# Patient Record
Sex: FEMALE | Race: WHITE | Employment: OTHER | URBAN - METROPOLITAN AREA
[De-identification: names, ages, dates, MRNs, and addresses within clinical notes are randomized per-mention and may not be internally consistent; named-entity substitution may affect disease eponyms.]

---

## 2017-05-12 ENCOUNTER — HOSPITAL ENCOUNTER (OUTPATIENT)
Dept: LAB | Age: 77
Discharge: HOME | End: 2017-05-12

## 2018-03-25 ENCOUNTER — HOSPITAL ENCOUNTER (EMERGENCY)
Age: 78
Discharge: HOME | End: 2018-03-25
Attending: EMERGENCY MEDICINE | Admitting: EMERGENCY MEDICINE
Payer: MEDICARE

## 2018-03-25 ENCOUNTER — APPOINTMENT (OUTPATIENT)
Dept: GENERAL RADIOLOGY | Age: 78
End: 2018-03-25
Attending: PHYSICIAN ASSISTANT
Payer: MEDICARE

## 2018-03-25 VITALS
TEMPERATURE: 98.6 F | RESPIRATION RATE: 16 BRPM | WEIGHT: 272 LBS | SYSTOLIC BLOOD PRESSURE: 145 MMHG | HEART RATE: 91 BPM | DIASTOLIC BLOOD PRESSURE: 99 MMHG | BODY MASS INDEX: 43.71 KG/M2 | OXYGEN SATURATION: 95 % | HEIGHT: 66 IN

## 2018-03-25 DIAGNOSIS — S80.12XA LEG HEMATOMA, LEFT, INITIAL ENCOUNTER: ICD-10-CM

## 2018-03-25 DIAGNOSIS — M25.562 LEFT KNEE PAIN, UNSPECIFIED CHRONICITY: Primary | ICD-10-CM

## 2018-03-25 PROCEDURE — 74011250637 HC RX REV CODE- 250/637: Performed by: PHYSICIAN ASSISTANT

## 2018-03-25 PROCEDURE — 73502 X-RAY EXAM HIP UNI 2-3 VIEWS: CPT

## 2018-03-25 PROCEDURE — 99283 EMERGENCY DEPT VISIT LOW MDM: CPT

## 2018-03-25 PROCEDURE — 73562 X-RAY EXAM OF KNEE 3: CPT

## 2018-03-25 PROCEDURE — 93971 EXTREMITY STUDY: CPT

## 2018-03-25 PROCEDURE — 73564 X-RAY EXAM KNEE 4 OR MORE: CPT

## 2018-03-25 RX ORDER — TRAMADOL HYDROCHLORIDE 50 MG/1
50 TABLET ORAL
Status: COMPLETED | OUTPATIENT
Start: 2018-03-25 | End: 2018-03-25

## 2018-03-25 RX ORDER — TRAMADOL HYDROCHLORIDE 50 MG/1
50 TABLET ORAL
Qty: 20 TAB | Refills: 0 | Status: SHIPPED | OUTPATIENT
Start: 2018-03-25 | End: 2018-04-04

## 2018-03-25 RX ADMIN — TRAMADOL HYDROCHLORIDE 50 MG: 50 TABLET, FILM COATED ORAL at 18:34

## 2018-03-26 NOTE — DISCHARGE INSTRUCTIONS
Knee Pain or Injury: Care Instructions  Your Care Instructions    Injuries are a common cause of knee problems. Sudden (acute) injuries may be caused by a direct blow to the knee. They can also be caused by abnormal twisting, bending, or falling on the knee. Pain, bruising, or swelling may be severe, and may start within minutes of the injury. Overuse is another cause of knee pain. Other causes are climbing stairs, kneeling, and other activities that use the knee. Everyday wear and tear, especially as you get older, also can cause knee pain. Rest, along with home treatment, often relieves pain and allows your knee to heal. If you have a serious knee injury, you may need tests and treatment. Follow-up care is a key part of your treatment and safety. Be sure to make and go to all appointments, and call your doctor if you are having problems. It's also a good idea to know your test results and keep a list of the medicines you take. How can you care for yourself at home? · Be safe with medicines. Read and follow all instructions on the label. ¨ If the doctor gave you a prescription medicine for pain, take it as prescribed. ¨ If you are not taking a prescription pain medicine, ask your doctor if you can take an over-the-counter medicine. · Rest and protect your knee. Take a break from any activity that may cause pain. · Put ice or a cold pack on your knee for 10 to 20 minutes at a time. Put a thin cloth between the ice and your skin. · Prop up a sore knee on a pillow when you ice it or anytime you sit or lie down for the next 3 days. Try to keep it above the level of your heart. This will help reduce swelling. · If your knee is not swollen, you can put moist heat, a heating pad, or a warm cloth on your knee. · If your doctor recommends an elastic bandage, sleeve, or other type of support for your knee, wear it as directed.   · Follow your doctor's instructions about how much weight you can put on your leg. Use a cane, crutches, or a walker as instructed. · Follow your doctor's instructions about activity during your healing process. If you can do mild exercise, slowly increase your activity. · Reach and stay at a healthy weight. Extra weight can strain the joints, especially the knees and hips, and make the pain worse. Losing even a few pounds may help. When should you call for help? Call 911 anytime you think you may need emergency care. For example, call if:  ? · You have symptoms of a blood clot in your lung (called a pulmonary embolism). These may include:  ¨ Sudden chest pain. ¨ Trouble breathing. ¨ Coughing up blood. ?Call your doctor now or seek immediate medical care if:  ? · You have severe or increasing pain. ? · Your leg or foot turns cold or changes color. ? · You cannot stand or put weight on your knee. ? · Your knee looks twisted or bent out of shape. ? · You cannot move your knee. ? · You have signs of infection, such as:  ¨ Increased pain, swelling, warmth, or redness. ¨ Red streaks leading from the knee. ¨ Pus draining from a place on your knee. ¨ A fever. ? · You have signs of a blood clot in your leg (called a deep vein thrombosis), such as:  ¨ Pain in your calf, back of the knee, thigh, or groin. ¨ Redness and swelling in your leg or groin. ? Watch closely for changes in your health, and be sure to contact your doctor if:  ? · You have tingling, weakness, or numbness in your knee. ? · You have any new symptoms, such as swelling. ? · You have bruises from a knee injury that last longer than 2 weeks. ? · You do not get better as expected. Where can you learn more? Go to http://bartolome-hiram.info/. Enter K195 in the search box to learn more about \"Knee Pain or Injury: Care Instructions. \"  Current as of: March 20, 2017  Content Version: 11.4  © 5425-2294 [employer].  Care instructions adapted under license by [employer] (which disclaims liability or warranty for this information). If you have questions about a medical condition or this instruction, always ask your healthcare professional. [address] any warranty or liability for your use of this information. Contusion: Care Instructions  Your Care Instructions    Contusion is the medical term for a bruise. It is the result of a direct blow or an impact, such as a fall. Contusions are common sports injuries. Most people think of a bruise as a black-and-blue spot. This happens when small blood vessels get torn and leak blood under the skin. But bones, muscles, and organs can also get bruised. This may damage deep tissues but not cause a bruise you can see. The doctor will do a physical exam to find the location of your contusion. You may also have tests to make sure you do not have a more serious injury, such as a broken bone or nerve damage. These may include X-rays or other imaging tests like a CT scan or MRI. Deep-tissue contusions may cause pain and swelling. But if there is no serious damage, they will often get better in a few weeks with home treatment. The doctor has checked you carefully, but problems can develop later. If you notice any problems or new symptoms, get medical treatment right away. Follow-up care is a key part of your treatment and safety. Be sure to make and go to all appointments, and call your doctor if you are having problems. It's also a good idea to know your test results and keep a list of the medicines you take. How can you care for yourself at home? · Put ice or a cold pack on the sore area for 10 to 20 minutes at a time to stop swelling. Put a thin cloth between the ice pack and your skin. · Be safe with medicines. Read and follow all instructions on the label. ¨ If the doctor gave you a prescription medicine for pain, take it as prescribed.   ¨ If you are not taking a prescription pain medicine, ask your doctor if you can take an over-the-counter medicine. · If you can, prop up the sore area on pillows as much as possible for the next few days. Try to keep the sore area above the level of your heart. When should you call for help? Call your doctor now or seek immediate medical care if:  ? · Your pain gets worse. ? · You have new or worse swelling. ? · You have tingling, weakness, or numbness in the area near the contusion. ? · The area near the contusion is cold or pale. ? Watch closely for changes in your health, and be sure to contact your doctor if:  ? · You do not get better as expected. Where can you learn more? Go to http://bartolome-hiram.info/. Enter P223 in the search box to learn more about \"Contusion: Care Instructions. \"  Current as of: March 20, 2017  Content Version: 11.4  © 6814-4046 [employer]. Care instructions adapted under license by [employer] (which disclaims liability or warranty for this information). If you have questions about a medical condition or this instruction, always ask your healthcare professional. [place] [address] any warranty or liability for your use of this information.

## 2018-03-26 NOTE — PROCEDURES
[facility]  *** FINAL REPORT ***    Name: Steven Metcalf  MRN: [id number]  : 1940  HIS Order #: [id number]  [id number] [facility] Visit #: [id number]  Date: 25 Mar 2018    TYPE OF TEST: Peripheral Venous Testing    REASON FOR TEST  Pain in limb, Limb swelling, S/P Fall    Left Leg:-  Deep venous thrombosis:           No  Superficial venous thrombosis:    No  Deep venous insufficiency:        No  Superficial venous insufficiency: No      INTERPRETATION/FINDINGS  PROCEDURE:  LEFT LOWER EXTREMITY VENOUS DUPLEX . Evaluation of lower  extremity veins with ultrasound (B-mode imaging, pulsed Doppler, color   Doppler). Includes the common femoral, deep femoral, femoral,  popliteal, posterior tibial, peroneal, and great saphenous veins. Other veins, for example the gastrocnemius and soleal veins, may also  be visualized. FINDINGS: Auther Ka scale and color flow duplex images of the veins in the  left lower extremity demonstrate normal compressibility, spontaneous  and augmented flow profiles, and absence of filling defects throughout   the deep and superficial veins in the left lower extremity. CONCLUSION: Left lower extremity venous duplex negative for deep  venous thrombosis or thrombophlebitis. Right common femoral vein is  thrombus free. NOTE: Echolucent area on medial anterior knee measuring   3.44 cm by 0.90 cm, consistent with a Hematoma vs. Seroma. ADDITIONAL COMMENTS    I have personally reviewed the data relevant to the interpretation of  this  study. TECHNOLOGIST: SUKHJINDER Santillan  Signed: 2018 08:41 PM    PHYSICIAN: Lazarus Led. Bertell Apple, MD  Signed: 2018 02:01 PM

## 2019-04-16 ENCOUNTER — HOSPITAL ENCOUNTER (OUTPATIENT)
Dept: CT IMAGING | Age: 79
Discharge: HOME | End: 2019-04-16
Attending: INTERNAL MEDICINE
Payer: MEDICARE

## 2019-04-16 DIAGNOSIS — E26.9 HYPERALDOSTERONISM (HCC): ICD-10-CM

## 2019-04-16 PROCEDURE — 74150 CT ABDOMEN W/O CONTRAST: CPT

## 2019-04-29 ENCOUNTER — HOSPITAL ENCOUNTER (OUTPATIENT)
Dept: ULTRASOUND IMAGING | Age: 79
Discharge: HOME | End: 2019-04-29
Attending: INTERNAL MEDICINE
Payer: MEDICARE

## 2019-04-29 DIAGNOSIS — N26.1 LEFT RENAL ATROPHY: ICD-10-CM

## 2019-04-29 PROCEDURE — 76770 US EXAM ABDO BACK WALL COMP: CPT

## 2019-05-06 ENCOUNTER — OFFICE VISIT (OUTPATIENT)
Dept: URBAN - METROPOLITAN AREA CLINIC 62 | Facility: CLINIC | Age: 79
End: 2019-05-06
Payer: MEDICARE

## 2019-05-06 DIAGNOSIS — H25.13 AGE-RELATED NUCLEAR CATARACT, BILATERAL: ICD-10-CM

## 2019-05-06 PROCEDURE — 92004 COMPRE OPH EXAM NEW PT 1/>: CPT | Performed by: OPHTHALMOLOGY

## 2019-05-06 PROCEDURE — 92133 CPTRZD OPH DX IMG PST SGM ON: CPT | Performed by: OPHTHALMOLOGY

## 2019-05-06 ASSESSMENT — VISUAL ACUITY
OS: 20/25
OD: 20/50

## 2019-05-06 ASSESSMENT — INTRAOCULAR PRESSURE
OD: 18
OS: 18

## 2019-05-06 NOTE — IMPRESSION/PLAN
Impression: Primary open-angle glaucoma, bilateral, severe stage: L71.5100. Plan: + Progression off drops. Trial Travatan Z qHS OU.  2 to 3 weeks IOP check and VF.

## 2019-05-06 NOTE — IMPRESSION/PLAN
Impression: Age-related nuclear cataract, bilateral: H25.13. Plan: Discussed sx vs observation. Patient elects to wait.

## 2019-05-23 ENCOUNTER — TESTING ONLY (OUTPATIENT)
Dept: URBAN - METROPOLITAN AREA CLINIC 62 | Facility: CLINIC | Age: 79
End: 2019-05-23
Payer: MEDICARE

## 2019-05-23 PROCEDURE — 92083 EXTENDED VISUAL FIELD XM: CPT | Performed by: OPHTHALMOLOGY

## 2019-06-17 ENCOUNTER — OFFICE VISIT (OUTPATIENT)
Dept: URBAN - METROPOLITAN AREA CLINIC 62 | Facility: CLINIC | Age: 79
End: 2019-06-17
Payer: MEDICARE

## 2019-06-17 PROCEDURE — 92012 INTRM OPH EXAM EST PATIENT: CPT | Performed by: OPHTHALMOLOGY

## 2019-06-17 RX ORDER — BRINZOLAMIDE 10 MG/ML
1 % SUSPENSION/ DROPS OPHTHALMIC
Qty: 1 | Refills: 6 | Status: INACTIVE
Start: 2019-06-17 | End: 2019-09-16

## 2019-06-17 ASSESSMENT — INTRAOCULAR PRESSURE
OS: 17
OD: 19
OS: 18

## 2019-06-17 NOTE — IMPRESSION/PLAN
Impression: Primary open-angle glaucoma, bilateral, severe stage: V32.6478. Plan: + Progression off drops. No improvement on Travatan Z D/c. Start Azopt Bid Ou. Schedule SLT both eyes,  OD then OS for better IOP control and to prevent fluctuations and progression. Discussed R/B/A. Discussed possible need further treatment including additional laser and surgery. RL2.

## 2019-07-31 ENCOUNTER — SURGERY (OUTPATIENT)
Dept: URBAN - METROPOLITAN AREA SURGERY 40 | Facility: SURGERY | Age: 79
End: 2019-07-31
Payer: MEDICARE

## 2019-07-31 ENCOUNTER — Encounter (OUTPATIENT)
Dept: URBAN - METROPOLITAN AREA SURGERY 39 | Facility: SURGERY | Age: 79
End: 2019-07-31
Payer: MEDICARE

## 2019-07-31 PROCEDURE — 65855 TRABECULOPLASTY LASER SURG: CPT | Performed by: OPHTHALMOLOGY

## 2019-08-07 ENCOUNTER — POST-OPERATIVE VISIT (OUTPATIENT)
Dept: URBAN - METROPOLITAN AREA CLINIC 62 | Facility: CLINIC | Age: 79
End: 2019-08-07

## 2019-08-07 PROCEDURE — 99024 POSTOP FOLLOW-UP VISIT: CPT | Performed by: OPTOMETRIST

## 2019-08-07 ASSESSMENT — INTRAOCULAR PRESSURE
OS: 15
OD: 13

## 2019-08-12 ENCOUNTER — Encounter (OUTPATIENT)
Dept: URBAN - METROPOLITAN AREA SURGERY 39 | Facility: SURGERY | Age: 79
End: 2019-08-12
Payer: MEDICARE

## 2019-08-12 ENCOUNTER — SURGERY (OUTPATIENT)
Dept: URBAN - METROPOLITAN AREA SURGERY 40 | Facility: SURGERY | Age: 79
End: 2019-08-12
Payer: MEDICARE

## 2019-08-12 PROCEDURE — 65855 TRABECULOPLASTY LASER SURG: CPT | Performed by: OPHTHALMOLOGY

## 2019-08-19 ENCOUNTER — POST-OPERATIVE VISIT (OUTPATIENT)
Dept: URBAN - METROPOLITAN AREA CLINIC 62 | Facility: CLINIC | Age: 79
End: 2019-08-19

## 2019-08-19 DIAGNOSIS — Z09 ENCNTR FOR F/U EXAM AFT TRTMT FOR COND OTH THAN MALIG NEOPLM: Primary | ICD-10-CM

## 2019-08-19 PROCEDURE — 99024 POSTOP FOLLOW-UP VISIT: CPT | Performed by: OPTOMETRIST

## 2019-08-19 ASSESSMENT — INTRAOCULAR PRESSURE
OD: 14
OS: 15

## 2019-09-16 ENCOUNTER — OFFICE VISIT (OUTPATIENT)
Dept: URBAN - METROPOLITAN AREA CLINIC 62 | Facility: CLINIC | Age: 79
End: 2019-09-16

## 2019-09-16 PROCEDURE — 92012 INTRM OPH EXAM EST PATIENT: CPT | Performed by: OPTOMETRIST

## 2019-09-16 RX ORDER — BRINZOLAMIDE 10 MG/ML
1 % SUSPENSION/ DROPS OPHTHALMIC
Qty: 1 | Refills: 6 | Status: INACTIVE
Start: 2019-09-16 | End: 2020-01-17

## 2019-09-16 ASSESSMENT — INTRAOCULAR PRESSURE
OS: 13
OD: 13

## 2019-09-16 NOTE — IMPRESSION/PLAN
Impression: Primary open-angle glaucoma, bilateral, severe stage: C81.5597. Plan: IOP improved. Continue Azopt Bid OU and monitor IOP in 4 months.

## 2020-01-17 ENCOUNTER — OFFICE VISIT (OUTPATIENT)
Dept: URBAN - METROPOLITAN AREA CLINIC 62 | Facility: CLINIC | Age: 80
End: 2020-01-17
Payer: MEDICARE

## 2020-01-17 PROCEDURE — 92012 INTRM OPH EXAM EST PATIENT: CPT | Performed by: OPTOMETRIST

## 2020-01-17 RX ORDER — BRINZOLAMIDE 10 MG/ML
1 % SUSPENSION/ DROPS OPHTHALMIC
Qty: 1 | Refills: 6 | Status: INACTIVE
Start: 2020-01-17 | End: 2020-05-22

## 2020-01-17 ASSESSMENT — INTRAOCULAR PRESSURE
OS: 12
OD: 18
OS: 21
OD: 11

## 2020-01-17 NOTE — IMPRESSION/PLAN
Impression: Primary open-angle glaucoma, bilateral, severe stage: T40.3777. Plan: IOP stable. Continue Azopt Bid Ou.

## 2020-05-22 ENCOUNTER — OFFICE VISIT (OUTPATIENT)
Dept: URBAN - METROPOLITAN AREA CLINIC 62 | Facility: CLINIC | Age: 80
End: 2020-05-22
Payer: MEDICARE

## 2020-05-22 DIAGNOSIS — H40.1133 PRIMARY OPEN-ANGLE GLAUCOMA, BILATERAL, SEVERE STAGE: Primary | ICD-10-CM

## 2020-05-22 DIAGNOSIS — H04.123 DRY EYE SYNDROME OF BILATERAL LACRIMAL GLANDS: ICD-10-CM

## 2020-05-22 PROCEDURE — 92012 INTRM OPH EXAM EST PATIENT: CPT | Performed by: OPTOMETRIST

## 2020-05-22 RX ORDER — BRINZOLAMIDE 10 MG/ML
1 % SUSPENSION/ DROPS OPHTHALMIC
Qty: 1 | Refills: 6 | Status: INACTIVE
Start: 2020-05-22 | End: 2020-09-25

## 2020-05-22 ASSESSMENT — INTRAOCULAR PRESSURE
OD: 15
OS: 14
OS: 15

## 2020-05-22 NOTE — IMPRESSION/PLAN
Impression: Primary open-angle glaucoma, bilateral, severe stage: A20.9811. Plan: IOP stable. Continue Azopt Bid Ou.

## 2020-08-03 ENCOUNTER — HOSPITAL ENCOUNTER (OUTPATIENT)
Dept: VASCULAR SURGERY | Age: 80
Discharge: HOME | End: 2020-08-03
Attending: FAMILY MEDICINE
Payer: MEDICARE

## 2020-08-03 ENCOUNTER — HOSPITAL ENCOUNTER (OUTPATIENT)
Dept: NON INVASIVE DIAGNOSTICS | Age: 80
Discharge: HOME | End: 2020-08-03
Attending: FAMILY MEDICINE
Payer: MEDICARE

## 2020-08-03 VITALS
WEIGHT: 272 LBS | DIASTOLIC BLOOD PRESSURE: 80 MMHG | BODY MASS INDEX: 43.71 KG/M2 | SYSTOLIC BLOOD PRESSURE: 168 MMHG | HEIGHT: 66 IN

## 2020-08-03 DIAGNOSIS — R60.9 EDEMA, UNSPECIFIED TYPE: ICD-10-CM

## 2020-08-03 DIAGNOSIS — I10 ESSENTIAL HYPERTENSION: ICD-10-CM

## 2020-08-03 LAB
ECHO AO ROOT DIAM: 3.2 CM
ECHO AR MAX VEL PISA: 347.71 CENTIMETER/SECOND
ECHO AV AREA PEAK VELOCITY: 1.38 CM2
ECHO AV AREA PEAK VELOCITY: 1.44 CM2
ECHO AV AREA VTI: 1.41 CM2
ECHO AV AREA/BSA VTI: 0.6 CM2/M2
ECHO AV MEAN GRADIENT: 19.91 MMHG
ECHO AV PEAK GRADIENT: 42.19 MMHG
ECHO AV PEAK VELOCITY: 324.75 CM/S
ECHO AV REGURGITANT PHT: 0.83 S
ECHO AV VTI: 74.21 CM
ECHO EST RA PRESSURE: 3 MMHG
ECHO LA MAJOR AXIS: 3.16 CM
ECHO LA MINOR AXIS: 1.39 CM
ECHO LV E' LATERAL VELOCITY: 11.85 CENTIMETER/SECOND
ECHO LV E' SEPTAL VELOCITY: 6.14 CENTIMETER/SECOND
ECHO LV INTERNAL DIMENSION DIASTOLIC: 3.93 CM (ref 3.9–5.3)
ECHO LV INTERNAL DIMENSION SYSTOLIC: 2.78 CM
ECHO LV IVSD: 0.93 CM (ref 0.6–0.9)
ECHO LV MASS 2D: 114.1 G (ref 67–162)
ECHO LV MASS INDEX 2D: 50.1 G/M2 (ref 43–95)
ECHO LV POSTERIOR WALL DIASTOLIC: 0.96 CM (ref 0.6–0.9)
ECHO LVOT DIAM: 2.01 CM
ECHO LVOT PEAK GRADIENT: 8.41 MMHG
ECHO LVOT PEAK GRADIENT: 8.6 MMHG
ECHO LVOT PEAK VELOCITY: 140.64 CM/S
ECHO LVOT PEAK VELOCITY: 146.59 CM/S
ECHO LVOT SV: 105 ML
ECHO LVOT VTI: 33 CM
ECHO MV A VELOCITY: 95.5 CENTIMETER/SECOND
ECHO MV AREA PHT: 2.66 CM2
ECHO MV E DECELERATION TIME (DT): 0.29 S
ECHO MV E VELOCITY: 139.16 CENTIMETER/SECOND
ECHO MV PRESSURE HALF TIME (PHT): 0.08 S
ECHO PV PEAK INSTANTANEOUS GRADIENT SYSTOLIC: 4.75 MMHG
ECHO PV REGURGITANT MAX VELOCITY: 109.02 CM/S
ECHO RA AREA 4C: 27.14 CM2
ECHO RIGHT VENTRICULAR SYSTOLIC PRESSURE (RVSP): 37.14 MMHG
ECHO RV INTERNAL DIMENSION: 4.16 CM
ECHO RV TAPSE: 2.33 CM (ref 1.5–2)
ECHO TV REGURGITANT MAX VELOCITY: 292.13 CM/S
ECHO TV REGURGITANT PEAK GRADIENT: 34.14 MMHG
LVOT MG: 4.15 MMHG

## 2020-08-03 PROCEDURE — 93306 TTE W/DOPPLER COMPLETE: CPT

## 2020-08-03 PROCEDURE — 93970 EXTREMITY STUDY: CPT

## 2020-09-25 ENCOUNTER — OFFICE VISIT (OUTPATIENT)
Dept: URBAN - METROPOLITAN AREA CLINIC 62 | Facility: CLINIC | Age: 80
End: 2020-09-25
Payer: MEDICARE

## 2020-09-25 PROCEDURE — 92012 INTRM OPH EXAM EST PATIENT: CPT | Performed by: OPTOMETRIST

## 2020-09-25 RX ORDER — BRINZOLAMIDE 10 MG/ML
1 % SUSPENSION/ DROPS OPHTHALMIC
Qty: 1 | Refills: 6 | Status: ACTIVE
Start: 2020-09-25

## 2020-09-25 ASSESSMENT — INTRAOCULAR PRESSURE
OS: 12
OD: 13

## 2020-09-25 NOTE — IMPRESSION/PLAN
Impression: Primary open-angle glaucoma, bilateral, severe stage: I67.1016. Plan: IOP stable. Continue Azopt Bid Ou.

## 2021-03-30 ENCOUNTER — TRANSCRIBE ORDER (OUTPATIENT)
Dept: SCHEDULING | Age: 81
End: 2021-03-30

## 2021-03-30 DIAGNOSIS — N95.1 MENOPAUSAL AND FEMALE CLIMACTERIC STATES: ICD-10-CM

## 2021-03-30 DIAGNOSIS — Z13.820 SCREENING FOR OSTEOPOROSIS: Primary | ICD-10-CM

## 2021-03-30 DIAGNOSIS — R01.1 HEART MURMUR: ICD-10-CM

## 2021-03-30 DIAGNOSIS — Z12.31 ENCOUNTER FOR SCREENING MAMMOGRAM FOR MALIGNANT NEOPLASM OF BREAST: ICD-10-CM

## 2021-03-31 ENCOUNTER — TRANSCRIBE ORDER (OUTPATIENT)
Dept: SCHEDULING | Age: 81
End: 2021-03-31

## 2021-03-31 DIAGNOSIS — N95.1 MENOPAUSAL AND FEMALE CLIMACTERIC STATES: Primary | ICD-10-CM

## 2021-04-14 ENCOUNTER — HOSPITAL ENCOUNTER (OUTPATIENT)
Dept: NON INVASIVE DIAGNOSTICS | Age: 81
Discharge: HOME | End: 2021-04-14
Attending: FAMILY MEDICINE
Payer: MEDICARE

## 2021-04-14 ENCOUNTER — HOSPITAL ENCOUNTER (OUTPATIENT)
Dept: MAMMOGRAPHY | Age: 81
Discharge: HOME | End: 2021-04-14
Attending: FAMILY MEDICINE
Payer: MEDICARE

## 2021-04-14 VITALS
HEIGHT: 66 IN | DIASTOLIC BLOOD PRESSURE: 78 MMHG | SYSTOLIC BLOOD PRESSURE: 137 MMHG | WEIGHT: 272 LBS | BODY MASS INDEX: 43.71 KG/M2

## 2021-04-14 DIAGNOSIS — R01.1 HEART MURMUR: ICD-10-CM

## 2021-04-14 DIAGNOSIS — Z12.31 ENCOUNTER FOR SCREENING MAMMOGRAM FOR MALIGNANT NEOPLASM OF BREAST: ICD-10-CM

## 2021-04-14 LAB
ECHO AO ASC DIAM: 3.03 CM
ECHO AO ROOT DIAM: 3.16 CM
ECHO AV AREA PEAK VELOCITY: 1.77 CM2
ECHO AV AREA PEAK VELOCITY: 1.79 CM2
ECHO AV AREA PEAK VELOCITY: 1.83 CM2
ECHO AV AREA PEAK VELOCITY: 1.86 CM2
ECHO AV AREA VTI: 1.91 CM2
ECHO AV AREA/BSA VTI: 0.8 CM2/M2
ECHO AV MEAN GRADIENT: 9.28 MMHG
ECHO AV PEAK GRADIENT: 16.8 MMHG
ECHO AV PEAK GRADIENT: 17.27 MMHG
ECHO AV PEAK VELOCITY: 204.93 CM/S
ECHO AV PEAK VELOCITY: 207.77 CM/S
ECHO AV VTI: 42.41 CM
ECHO IVC PROX: 2.33 CM
ECHO LA AREA 4C: 22.26 CM2
ECHO LA MAJOR AXIS: 3.91 CM
ECHO LA MINOR AXIS: 1.72 CM
ECHO LA VOL 2C: 73.77 ML (ref 22–52)
ECHO LA VOL 4C: 66.17 ML (ref 22–52)
ECHO LA VOL BP: 73.79 ML (ref 22–52)
ECHO LA VOL/BSA BIPLANE: 32.37 ML/M2 (ref 16–28)
ECHO LA VOLUME INDEX A2C: 32.36 ML/M2 (ref 16–28)
ECHO LA VOLUME INDEX A4C: 29.03 ML/M2 (ref 16–28)
ECHO LV E' LATERAL VELOCITY: 9.85 CENTIMETER/SECOND
ECHO LV E' SEPTAL VELOCITY: 5.48 CENTIMETER/SECOND
ECHO LV INTERNAL DIMENSION DIASTOLIC: 3.59 CM (ref 3.9–5.3)
ECHO LV INTERNAL DIMENSION SYSTOLIC: 2.63 CM
ECHO LV IVSD: 1.28 CM (ref 0.6–0.9)
ECHO LV MASS 2D: 154.7 G (ref 67–162)
ECHO LV MASS INDEX 2D: 67.9 G/M2 (ref 43–95)
ECHO LV POSTERIOR WALL DIASTOLIC: 1.27 CM (ref 0.6–0.9)
ECHO LVOT DIAM: 1.97 CM
ECHO LVOT PEAK GRADIENT: 5.76 MMHG
ECHO LVOT PEAK GRADIENT: 6.21 MMHG
ECHO LVOT PEAK VELOCITY: 120.03 CM/S
ECHO LVOT PEAK VELOCITY: 124.6 CM/S
ECHO LVOT SV: 81.1 ML
ECHO LVOT VTI: 26.52 CM
ECHO MV A VELOCITY: 91.57 CENTIMETER/SECOND
ECHO MV AREA PHT: 2.78 CM2
ECHO MV E DECELERATION TIME (DT): 272.56 MS
ECHO MV E VELOCITY: 107.9 CENTIMETER/SECOND
ECHO MV PRESSURE HALF TIME (PHT): 79.04 MS
ECHO PV MAX VELOCITY: 117.74 CM/S
ECHO PV PEAK INSTANTANEOUS GRADIENT SYSTOLIC: 5.55 MMHG
ECHO RV INTERNAL DIMENSION: 4.59 CM
ECHO RV TAPSE: 2.11 CM (ref 1.5–2)
ECHO TV REGURGITANT MAX VELOCITY: 216.01 CM/S
ECHO TV REGURGITANT PEAK GRADIENT: 18.66 MMHG
LA VOL DISK BP: 69.86 ML (ref 22–52)
LVOT MG: 2.63 MMHG

## 2021-04-14 PROCEDURE — 77067 SCR MAMMO BI INCL CAD: CPT

## 2021-04-14 PROCEDURE — 93306 TTE W/DOPPLER COMPLETE: CPT

## 2021-04-14 PROCEDURE — 93306 TTE W/DOPPLER COMPLETE: CPT | Performed by: INTERNAL MEDICINE

## 2021-04-15 ENCOUNTER — TRANSCRIBE ORDER (OUTPATIENT)
Dept: SCHEDULING | Age: 81
End: 2021-04-15

## 2021-04-15 DIAGNOSIS — R92.8 ABNORMAL MAMMOGRAM: ICD-10-CM

## 2021-04-15 DIAGNOSIS — Z12.31 VISIT FOR SCREENING MAMMOGRAM: Primary | ICD-10-CM

## 2021-04-19 ENCOUNTER — HOSPITAL ENCOUNTER (OUTPATIENT)
Dept: MAMMOGRAPHY | Age: 81
Discharge: HOME | End: 2021-04-19
Attending: FAMILY MEDICINE
Payer: MEDICARE

## 2021-04-19 DIAGNOSIS — R92.8 ABNORMAL MAMMOGRAM: ICD-10-CM

## 2021-04-19 PROCEDURE — 77066 DX MAMMO INCL CAD BI: CPT

## 2021-04-19 NOTE — PROGRESS NOTES
Spoke with Cecilio Acosta in [facility] office. She took a message to have the nurse return my call re:  Left breast biopsy.

## 2021-04-19 NOTE — PROGRESS NOTES
I spoke with Maico Hernandez in Dr. Mario Merritt' office and relayed breast biopsy recommendation. It is fine to do procedure here. She will fax order to us. She recommends Dr. Randall Sams if a breast surgeon is needed.

## 2021-04-27 ENCOUNTER — HOSPITAL ENCOUNTER (OUTPATIENT)
Dept: MAMMOGRAPHY | Age: 81
Discharge: HOME | End: 2021-04-27
Attending: FAMILY MEDICINE
Payer: MEDICARE

## 2021-04-27 DIAGNOSIS — R92.8 ABNORMAL MAMMOGRAM: ICD-10-CM

## 2021-04-27 PROCEDURE — 88305 TISSUE EXAM BY PATHOLOGIST: CPT

## 2021-04-27 PROCEDURE — 74011000250 HC RX REV CODE- 250: Performed by: RADIOLOGY

## 2021-04-27 PROCEDURE — A4648 IMPLANTABLE TISSUE MARKER: HCPCS

## 2021-04-27 PROCEDURE — 77065 DX MAMMO INCL CAD UNI: CPT

## 2021-04-27 RX ORDER — LIDOCAINE HYDROCHLORIDE AND EPINEPHRINE 10; 10 MG/ML; UG/ML
20 INJECTION, SOLUTION INFILTRATION; PERINEURAL ONCE
Status: COMPLETED | OUTPATIENT
Start: 2021-04-27 | End: 2021-04-27

## 2021-04-27 RX ORDER — LIDOCAINE HYDROCHLORIDE 10 MG/ML
5 INJECTION INFILTRATION; PERINEURAL
Status: COMPLETED | OUTPATIENT
Start: 2021-04-27 | End: 2021-04-27

## 2021-04-27 RX ADMIN — LIDOCAINE HYDROCHLORIDE,EPINEPHRINE BITARTRATE 200 MG: 10; .01 INJECTION, SOLUTION INFILTRATION; PERINEURAL at 10:30

## 2021-04-27 RX ADMIN — LIDOCAINE HYDROCHLORIDE 5 ML: 10 INJECTION, SOLUTION INFILTRATION; PERINEURAL at 10:30

## 2021-05-03 NOTE — PROGRESS NOTES
Dr. Walter Harper approved pathology. Called patient with results. Advised her that because atypical cells were found, the recommendation is that she meet with a breast surgeon to discuss next steps. Appointment made with Dr. Komal Hayes 4/29 at [facility] at the [facility]. She reports that the biopsy site is healing well and she has no concerns. Encouraged her to call with any questions.

## 2021-05-19 ENCOUNTER — OFFICE VISIT (OUTPATIENT)
Dept: SURGERY | Age: 81
End: 2021-05-19
Payer: MEDICARE

## 2021-05-19 VITALS
WEIGHT: 272 LBS | HEART RATE: 78 BPM | SYSTOLIC BLOOD PRESSURE: 148 MMHG | HEIGHT: 66 IN | DIASTOLIC BLOOD PRESSURE: 68 MMHG | BODY MASS INDEX: 43.71 KG/M2

## 2021-05-19 DIAGNOSIS — R92.8 ABNORMAL MAMMOGRAM OF LEFT BREAST: Primary | ICD-10-CM

## 2021-05-19 DIAGNOSIS — N60.92 ATYPICAL HYPERPLASIA OF LACTIFEROUS DUCT OF LEFT BREAST: ICD-10-CM

## 2021-05-19 PROCEDURE — G8400 PT W/DXA NO RESULTS DOC: HCPCS | Performed by: SURGERY

## 2021-05-19 PROCEDURE — G8536 NO DOC ELDER MAL SCRN: HCPCS | Performed by: SURGERY

## 2021-05-19 PROCEDURE — 1090F PRES/ABSN URINE INCON ASSESS: CPT | Performed by: SURGERY

## 2021-05-19 PROCEDURE — 99203 OFFICE O/P NEW LOW 30 MIN: CPT | Performed by: SURGERY

## 2021-05-19 PROCEDURE — G8427 DOCREV CUR MEDS BY ELIG CLIN: HCPCS | Performed by: SURGERY

## 2021-05-19 PROCEDURE — 1101F PT FALLS ASSESS-DOCD LE1/YR: CPT | Performed by: SURGERY

## 2021-05-19 PROCEDURE — G8419 CALC BMI OUT NRM PARAM NOF/U: HCPCS | Performed by: SURGERY

## 2021-05-19 PROCEDURE — G8432 DEP SCR NOT DOC, RNG: HCPCS | Performed by: SURGERY

## 2021-05-19 RX ORDER — VALSARTAN 320 MG/1
320 TABLET ORAL DAILY
COMMUNITY

## 2021-05-19 RX ORDER — CALCIUM/MAGNESIUM 300-300 MG
TABLET ORAL
COMMUNITY

## 2021-05-19 RX ORDER — ERGOCALCIFEROL 1.25 MG/1
50000 CAPSULE ORAL
COMMUNITY

## 2021-05-19 RX ORDER — AMLODIPINE BESYLATE 5 MG/1
TABLET ORAL
COMMUNITY
Start: 2021-02-25

## 2021-05-19 NOTE — LETTER
5/19/2021 4:38 PM 
 
Patient: Nghia Miller YOB: 1940 Date of Visit: 5/19/2021 Dear Dr. Diamond Lozano: Thank you for referring Ms. Vee Doyle to me for evaluation/treatment. Below are the relevant portions of my assessment and plan of care. If you have questions, please do not hesitate to call me. I look forward to following Ms. Deneen Hogue along with you.  
 
 
 
Sincerely, 
 
 
Sander Mendes MD

## 2021-05-19 NOTE — PATIENT INSTRUCTIONS

## 2021-05-19 NOTE — PROGRESS NOTES
HISTORY OF PRESENT ILLNESS  Nain Cabello is a [de-identified] y.o. female. HPI  NEW patient referred by Dr. Chris Camilo for consultation for LEFT breast atypical micropapillary ductal hyperplasia noted on recent bx. Patient not experiencing any pain. 21:  FINAL PATHOLOGIC DIAGNOSIS   Breast, left, needle core biopsy:   Atypical micropapillary ductal hyperplasia   Fibrocystic changes including lobular sclerosis and microcalcification     No family history of breast cancer. Imagin21: BL screening mammogram, BI-RADS 0.  21: LEFT diagnostic mammogram, BI-RADS 4a    [facility] Results (most recent):  Results from [place] encounter on 21     [facility] POST BX IMAGING LT INCL CAD     Addendum 2021  9:00 PM  Addendum: Addendum to the left breast biopsy:     Findings: Pathology of the left breast calcifications is consistent with  fibrocystic changes and microcalcifications, as well as atypical micropapillary  ductal hyperplasia . The imaging findings are concordant with the histology  results. Recommend surgical consultation due to the atypical cells . The patient  was contacted by staff of the [address] [place] [address].     Narrative  STUDY: STEREOTACTIC LEFT BREAST BIOPSY     INDICATION:  60-year-old female with left breast calcifications, presenting for  biopsy.     PROCEDURE:     The risks and benefits of the procedure were explained to the patient, questions  were answered, and informed consent was obtained.     Calcifications in the posterior third 12:00 location of the left breast were  localized using stereotactic technique. The skin was prepped, and local  anesthetic was applied. 8 biopsy cores were obtained using an 11 gauge  vacuum-assisted Eviva device and a superior approach. Magnification radiography  confirmed the presence of calcifications in the specimens. A metallic clip was  deployed at the biopsy site. There were no immediate complications. Post-biopsy  digital mammogram confirms the presence of the metallic clip at the intended  biopsy site. There is a small postbiopsy hematoma measuring approximately 3 cm. Specimens were forwarded to Pathology for histologic evaluation.     Impression  Vacuum-assisted stereotactic biopsy of calcifications in the  posterior third, 12:00 location of the left breast. Calcifications were  confirmed in the specimens. A metallic clip was deployed at the biopsy site. There were no immediate complications. Further recommendations will be based on  final pathology results. Past Medical History:   Diagnosis Date    Anxiety     GERD (gastroesophageal reflux disease)     Hiatal hernia     Hypertension     Other ill-defined conditions(009.89) incont.  Psychiatric disorder     Sleep disorder        Past Surgical History:   Procedure Laterality Date    HX ORTHOPAEDIC  ankle    HX UROLOGICAL  bladder       Social History     Socioeconomic History    Marital status:      Spouse name: Not on file    Number of children: Not on file    Years of education: Not on file    Highest education level: Not on file   Occupational History    Not on file   Tobacco Use    Smoking status: Never Smoker    Smokeless tobacco: Never Used   Substance and Sexual Activity    Alcohol use: No    Drug use: No    Sexual activity: Not on file   Other Topics Concern    Not on file   Social History Narrative    Not on file     Social Determinants of Health     Financial Resource Strain:     Difficulty of Paying Living Expenses:    Food Insecurity:     Worried About Running Out of Food in the Last Year:     [address] in the Last Year:    Transportation Needs:     Lack of Transportation (Medical):      Lack of Transportation (Non-Medical):    Physical Activity:     Days of Exercise per Week:     Minutes of Exercise per Session:    Stress:     Feeling of Stress :    Social Connections:     Frequency of Communication with Friends and Family:     Frequency of Social Gatherings with Friends and Family:     Attends Christian Services:     Active Member of Clubs or Organizations:     Attends Club or Organization Meetings:     Marital Status:    Intimate Partner Violence:     Fear of Current or Ex-Partner:     Emotionally Abused:     Physically Abused:     Sexually Abused:        Current Outpatient Medications on File Prior to Visit   Medication Sig Dispense Refill    valsartan (DIOVAN) 320 mg tablet Take 320 mg by mouth daily.  amLODIPine (NORVASC) 5 mg tablet TAKE 1 TABLET BY MOUTH EVERY DAY      ergocalciferol (Vitamin D2) 1,250 mcg (50,000 unit) capsule Take 50,000 Units by mouth.  calcium-magnesium 300-300 mg tab Take  by mouth.  ciprofloxacin HCl (CIPRO) 500 mg tablet Take 500 mg by mouth two (2) times a day. (Patient not taking: Reported on 5/19/2021)      RABEprazole (ACIPHEX) 20 mg tablet Take 20 mg by mouth daily. (Patient not taking: Reported on 5/19/2021)      AMLODIPINE/VALSARTAN (EXFORGE PO) Take  by mouth. (Patient not taking: Reported on 5/19/2021)       No current facility-administered medications on file prior to visit. Allergies   Allergen Reactions    Codeine Unknown (comments)    Prednisone Hives       OB History    No obstetric history on file. Obstetric Comments   Menarche 6, LMP -, # of children 0, age of 1st delivery -, Hysterectomy/oophorectomy no/no, Breast bx yes, history of breast feeding no, BCP no, Hormone therapy no             Review of Systems   Constitutional: Positive for malaise/fatigue. HENT: Positive for congestion (\"sometimes\") and tinnitus. Eyes: Negative. Respiratory: Positive for shortness of breath (\"when walking or exercising\"). Cardiovascular: Positive for leg swelling (\"at times\"). Gastrointestinal: Positive for diarrhea. Genitourinary: Negative. Musculoskeletal: Positive for back pain, falls and joint pain.    Skin: Negative. Neurological: Positive for tremors (\"hands\"). Endo/Heme/Allergies: Negative. Psychiatric/Behavioral: The patient has insomnia (\"at times\"). Physical Exam  Exam conducted with a chaperone present. Cardiovascular:      Rate and Rhythm: Normal rate and regular rhythm. Heart sounds: Normal heart sounds. Pulmonary:      Breath sounds: Normal breath sounds. Chest:      Breasts: Breasts are symmetrical.         Right: Normal. No swelling, bleeding, inverted nipple, mass, nipple discharge, skin change or tenderness. Left: Normal. No swelling, bleeding, inverted nipple, mass, nipple discharge, skin change or tenderness. Lymphadenopathy:      Cervical:      Right cervical: No superficial, deep or posterior cervical adenopathy. Left cervical: No superficial, deep or posterior cervical adenopathy. Upper Body:      Right upper body: No supraclavicular or axillary adenopathy. Left upper body: No supraclavicular or axillary adenopathy. ASSESSMENT and PLAN    ICD-10-CM ICD-9-CM    1. Abnormal mammogram of left breast  R92.8 793.80 [facility] 3D DAREK W MAMMO LT DX INCL CAD   2. 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 Small post-bx hematoma, but no associated mass on mammogram. Reviewed bx PATH. Discussed increased risk for breast cancer, but specific treatment is not necessary for this. Discussed options for excision and observation, and pt has opted for observation. Will order 6 month repeat mammogram. F/U in 6 months, after mammogram. This plan was reviewed with the patient and patient agrees. All questions were answered. Total time spent was 30 minutes.     Written by Christopher Canada, as dictated by Dr. Johnny Croft MD.

## 2021-05-19 NOTE — PROGRESS NOTES
HISTORY OF PRESENT ILLNESS Jostin Canela is a [de-identified] y.o. female. HPI NEW PATIENT here for consultation because of mammogram results left breast.. done here. 4/27/21 [facility] Patient not experiencing any pain. Referred by Dr. Gwyn Patterson. No family history of breast cancer [facility] Results (most recent): 
Results from Hospital Encounter encounter on 04/27/21 [facility] POST BX IMAGING LT INCL CAD Addendum 5/6/2021  9:00 PM 
Addendum: Addendum to the left breast biopsy: 
 
Findings: Pathology of the left breast calcifications is consistent with 
fibrocystic changes and microcalcifications, as well as atypical micropapillary 
ductal hyperplasia . The imaging findings are concordant with the histology 
results. Recommend surgical consultation due to the atypical cells . The patient 
was contacted by staff of the [address] [place] [address]. 
 
Narrative STUDY: STEREOTACTIC LEFT BREAST BIOPSY INDICATION:  28-year-old female with left breast calcifications, presenting for 
biopsy. PROCEDURE: The risks and benefits of the procedure were explained to the patient, questions 
were answered, and informed consent was obtained. Calcifications in the posterior third 12:00 location of the left breast were 
localized using stereotactic technique. The skin was prepped, and local 
anesthetic was applied. 8 biopsy cores were obtained using an 11 gauge 
vacuum-assisted Eviva device and a superior approach. Magnification radiography 
confirmed the presence of calcifications in the specimens. A metallic clip was 
deployed at the biopsy site. There were no immediate complications. Post-biopsy 
digital mammogram confirms the presence of the metallic clip at the intended 
biopsy site. There is a small postbiopsy hematoma measuring approximately 3 cm. Specimens were forwarded to Pathology for histologic evaluation. Impression Vacuum-assisted stereotactic biopsy of calcifications in the 
posterior third, 12:00 location of the left breast. Calcifications were 
confirmed in the specimens. A metallic clip was deployed at the biopsy site. There were no immediate complications. Further recommendations will be based on 
final pathology results. ROS Physical Exam 
 
ASSESSMENT and PLAN 
{ASSESSMENT/PLAN:72911}

## 2021-08-16 NOTE — ED PROVIDER NOTES
Chief Complaint:   No chief complaint on file. HPI:   This is a 81year old male coming in for healthcare check. Patient feeling well. He does have a lot of difficulty with his hearing loss.   He wears his hearing aids but feels that they are gett HPI Comments: 69 y.o. female with past medical history significant for HTN, anxiety, and GERD who presents from home with chief complaint of left knee pain s/p fall 6 days ago. Pt reports left knee pain (generalized but worse left anterior knee), swelling, and bruising. Pain is exacerbated by movement, and pt has not taken any medications for pain. She applied ice for the first 2 days and has been applying heat since. She states she was evaluated by ortho on call 5 days ago with XR's that showed bilateral knee arthritis. She states orthopedic surgeon recommended she come to the ED for Venous Duplex US if she experienced an increase in knee swelling. Pt denies taking fish oil or anticoagulation/asa. She also c/o left hip pain since 15 minutes ago. Denies CP, SOB, nausea, vomiting, and abdominal pain. There are no other acute medical concerns at this time. Social hx: never smoker, no alcohol or drug use. Patient is retired. PCP: Mary Farah MD    Note written by Tay Hoang, as dictated by Estiven Lamar. ISAIAH Lynn 6:24 PM      The history is provided by the patient. No  was used. Past Medical History:   Diagnosis Date    Anxiety     GERD (gastroesophageal reflux disease)     Hiatal hernia     Hypertension     Other ill-defined conditions(339.95) incont.  Psychiatric disorder     Sleep disorder        Past Surgical History:   Procedure Laterality Date    HX ORTHOPAEDIC  ankle    HX UROLOGICAL  bladder         No family history on file. Social History     Social History    Marital status:      Spouse name: N/A    Number of children: N/A    Years of education: N/A     Occupational History    Not on file.      Social History Main Topics    Smoking status: Never Smoker    Smokeless tobacco: Not on file    Alcohol use No    Drug use: No    Sexual activity: Not on file     Other Topics Concern    Not on file     Social History Narrative ALLERGIES: Codeine and Prednisone    Review of Systems   Constitutional: Negative for chills and fever. HENT: Negative for congestion, rhinorrhea, sneezing and sore throat. Eyes: Negative for redness and visual disturbance. Respiratory: Negative for shortness of breath. Cardiovascular: Negative for chest pain and leg swelling. Gastrointestinal: Negative for abdominal pain, nausea and vomiting. Genitourinary: Negative for difficulty urinating and frequency. Musculoskeletal: Positive for arthralgias, gait problem and joint swelling. Negative for back pain, myalgias and neck stiffness. Positive for left knee pain and left hip pain   Skin: Positive for color change. Negative for rash. Neurological: Negative for dizziness, syncope, weakness and headaches. Hematological: Negative for adenopathy. Vitals:    03/25/18 1802   BP: (!) 145/99   Pulse: 91   Resp: 16   Temp: 98.6 °F (37 °C)   SpO2: 95%   Weight: 123.4 kg (272 lb)   Height: 5' 6\" (1.676 m)            Physical Exam   Constitutional: She is oriented to person, place, and time. She appears well-developed and well-nourished. No distress. Above average bmi female   HENT:   Head: Normocephalic and atraumatic. Right Ear: External ear normal.   Left Ear: External ear normal.   Eyes: EOM are normal. Pupils are equal, round, and reactive to light. Neck: Neck supple. Cardiovascular: Normal rate, regular rhythm, normal heart sounds and intact distal pulses. Exam reveals no gallop and no friction rub. No murmur heard. Pulmonary/Chest: Effort normal and breath sounds normal. No stridor. No respiratory distress. She has no wheezes. She has no rales. She exhibits no tenderness. Musculoskeletal: She exhibits edema and tenderness. She exhibits no deformity. Left knee with significant swelling, ecchymosis and ttp extending down calf to ankle. + distal n/v intact cap refill brisk. Normothermic. No lesions. No laxity.  ROM Father    • Hypertension Mother    • Cancer Mother         female cancer   • Other (deafness) Mother    • Heart Disorder Sister    • Diabetes Sister    • Heart Disorder Brother       Social History    Socioeconomic History      Marital status:  limited by pain and swelling. Cap refill brisk. Left hip diffusely TTP with ROM knee. No deformity or lesions. Neurological: She is alert and oriented to person, place, and time. No cranial nerve deficit. Coordination normal.   Skin: No rash noted. No erythema. No pallor. Psychiatric: She has a normal mood and affect. Her behavior is normal.   Nursing note and vitals reviewed. MDM  Number of Diagnoses or Management Options     Amount and/or Complexity of Data Reviewed  Tests in the radiology section of CPT®: ordered and reviewed  Obtain history from someone other than the patient: yes ()  Review and summarize past medical records: yes  Independent visualization of images, tracings, or specimens: yes    Patient Progress  Patient progress: stable        ED Course       Procedures  6:55 PM  Discussed pt, sx, hx and current findings with Dr Ermelinda Betts. he is in agreement with plan and will see pt. Will get xrays, give pain meds, ice and US leg  Nallely Messer. ISAIAH Lynn      8:12 PM   Xrays neg, awaiting US. Nallely Messer. ISAIAH Lynn    8:12 PM  Discussed pt's hx, disposition, and available diagnostic and imaging results with Dr Ermelinda Betts. Reviewed care plans. Agrees with plans as outlined. Care of pt transferred to Dr Ermelinda Betts. Nallely Messer. ISAIAH Lynn             VITALS:   Patient Vitals for the past 8 hrs:   Temp Pulse Resp BP SpO2   03/25/18 1802 98.6 °F (37 °C) 91 16 (!) 145/99 95 %            Xr Hip Lt W Or Wo Pelv 2-3 Vws    Result Date: 3/25/2018  INDICATION:  fall pain EXAMINATION:  HIP RADIOGRAPHS COMPARISON: None FINDINGS: Single AP view of the pelvis and frogleg lateral view of the left hip demonstrate no acute fracture or dislocation. The pubic symphysis is intact. The sacroiliac joints are intact. IMPRESSION: No acute process.      Xr Knee Lt 3 V    Result Date: 3/25/2018  INDICATION:   fall, pain, swelling COMPARISON:  7/9/11 FINDINGS: 3 views of the right knee demonstrate no acute throat  INTEGUMENTARY:  seee HPI     CARDIOVASCULAR: see HPI   Denies  chest discomfort, palpitations, edema,    RESPIRATORY:  Denies shortness of breath, wheezing, cough or sputum production.   GASTROINTESTINAL:  Denies abdominal pain, nausea, vomiting, co fracture or subluxation. There is no significant soft tissue swelling. There is no joint effusion. IMPRESSION: No acute fracture.      Prelim lower leg ultrasound - neg for DVT, +for hematoma noted.    ASSESSMENT AND PLAN:   1. Encounter for annual health examination  Healthy. Stay active. 2. Essential hypertension  Continue with current medications.     3. Mixed conductive and sensorineural hearing loss of both ears  Patient going to the [place]

## 2021-09-30 ENCOUNTER — HOSPITAL ENCOUNTER (EMERGENCY)
Age: 81
Discharge: HOME | End: 2021-10-01
Attending: EMERGENCY MEDICINE
Payer: MEDICARE

## 2021-09-30 ENCOUNTER — APPOINTMENT (OUTPATIENT)
Dept: ULTRASOUND IMAGING | Age: 81
End: 2021-09-30
Attending: EMERGENCY MEDICINE
Payer: MEDICARE

## 2021-09-30 DIAGNOSIS — N95.0 POSTMENOPAUSAL BLEEDING: Primary | ICD-10-CM

## 2021-09-30 LAB
ALBUMIN SERPL-MCNC: 3.8 G/DL (ref 3.5–5)
ALBUMIN/GLOB SERPL: 0.9 {RATIO} (ref 1.1–2.2)
ALP SERPL-CCNC: 85 U/L (ref 45–117)
ALT SERPL-CCNC: 28 U/L (ref 12–78)
ANION GAP BLD CALC-SCNC: 9 MMOL/L (ref 10–20)
ANION GAP SERPL CALC-SCNC: 8 MMOL/L (ref 5–15)
APPEARANCE UR: CLEAR
AST SERPL-CCNC: 30 U/L (ref 15–37)
BACTERIA URNS QL MICRO: NEGATIVE /HPF
BASOPHILS # BLD: 0.1 K/UL (ref 0–0.1)
BASOPHILS NFR BLD: 1 % (ref 0–1)
BILIRUB SERPL-MCNC: 0.6 MG/DL (ref 0.2–1)
BILIRUB UR QL: NEGATIVE
BUN SERPL-MCNC: 20 MG/DL (ref 6–20)
BUN/CREAT SERPL: 19 (ref 12–20)
CA-I BLD-MCNC: 1.09 MMOL/L (ref 1.12–1.32)
CALCIUM SERPL-MCNC: 9.2 MG/DL (ref 8.5–10.1)
CHLORIDE BLD-SCNC: 98 MMOL/L (ref 98–107)
CHLORIDE SERPL-SCNC: 98 MMOL/L (ref 97–108)
CO2 BLD-SCNC: 26.5 MMOL/L (ref 21–32)
CO2 SERPL-SCNC: 26 MMOL/L (ref 21–32)
COLOR UR: ABNORMAL
CREAT BLD-MCNC: 1.05 MG/DL (ref 0.6–1.3)
CREAT SERPL-MCNC: 1.07 MG/DL (ref 0.55–1.02)
DIFFERENTIAL METHOD BLD: ABNORMAL
EOSINOPHIL # BLD: 0.4 K/UL (ref 0–0.4)
EOSINOPHIL NFR BLD: 5 % (ref 0–7)
EPITH CASTS URNS QL MICRO: ABNORMAL /LPF
ERYTHROCYTE [DISTWIDTH] IN BLOOD BY AUTOMATED COUNT: 13 % (ref 11.5–14.5)
GLOBULIN SER CALC-MCNC: 4.4 G/DL (ref 2–4)
GLUCOSE BLD-MCNC: 108 MG/DL (ref 65–100)
GLUCOSE SERPL-MCNC: 98 MG/DL (ref 65–100)
GLUCOSE UR STRIP.AUTO-MCNC: NEGATIVE MG/DL
HCT VFR BLD AUTO: 44.6 % (ref 35–47)
HGB BLD-MCNC: 15.5 G/DL (ref 11.5–16)
HGB UR QL STRIP: ABNORMAL
HYALINE CASTS URNS QL MICRO: ABNORMAL /LPF (ref 0–5)
IMM GRANULOCYTES # BLD AUTO: 0 K/UL (ref 0–0.04)
IMM GRANULOCYTES NFR BLD AUTO: 0 % (ref 0–0.5)
KETONES UR QL STRIP.AUTO: NEGATIVE MG/DL
LEUKOCYTE ESTERASE UR QL STRIP.AUTO: NEGATIVE
LYMPHOCYTES # BLD: 4.2 K/UL (ref 0.8–3.5)
LYMPHOCYTES NFR BLD: 44 % (ref 12–49)
MCH RBC QN AUTO: 31.5 PG (ref 26–34)
MCHC RBC AUTO-ENTMCNC: 34.8 G/DL (ref 30–36.5)
MCV RBC AUTO: 90.7 FL (ref 80–99)
MONOCYTES # BLD: 1 K/UL (ref 0–1)
MONOCYTES NFR BLD: 11 % (ref 5–13)
NEUTS SEG # BLD: 3.7 K/UL (ref 1.8–8)
NEUTS SEG NFR BLD: 39 % (ref 32–75)
NITRITE UR QL STRIP.AUTO: NEGATIVE
NRBC # BLD: 0 K/UL (ref 0–0.01)
NRBC BLD-RTO: 0 PER 100 WBC
PH UR STRIP: 7 [PH] (ref 5–8)
PLATELET # BLD AUTO: 220 K/UL (ref 150–400)
PMV BLD AUTO: 11.5 FL (ref 8.9–12.9)
POTASSIUM BLD-SCNC: 7.2 MMOL/L (ref 3.5–5.1)
POTASSIUM SERPL-SCNC: 4.4 MMOL/L (ref 3.5–5.1)
PROT SERPL-MCNC: 8.2 G/DL (ref 6.4–8.2)
PROT UR STRIP-MCNC: NEGATIVE MG/DL
RBC # BLD AUTO: 4.92 M/UL (ref 3.8–5.2)
RBC #/AREA URNS HPF: ABNORMAL /HPF (ref 0–5)
SERVICE CMNT-IMP: ABNORMAL
SERVICE CMNT-IMP: ABNORMAL
SODIUM BLD-SCNC: 132 MMOL/L (ref 136–145)
SODIUM SERPL-SCNC: 132 MMOL/L (ref 136–145)
SP GR UR REFRACTOMETRY: 1.01 (ref 1–1.03)
UR CULT HOLD, URHOLD: NORMAL
UROBILINOGEN UR QL STRIP.AUTO: 1 EU/DL (ref 0.2–1)
WBC # BLD AUTO: 9.4 K/UL (ref 3.6–11)
WBC URNS QL MICRO: ABNORMAL /HPF (ref 0–4)

## 2021-09-30 PROCEDURE — 36415 COLL VENOUS BLD VENIPUNCTURE: CPT

## 2021-09-30 PROCEDURE — 99284 EMERGENCY DEPT VISIT MOD MDM: CPT

## 2021-09-30 PROCEDURE — 76830 TRANSVAGINAL US NON-OB: CPT

## 2021-09-30 PROCEDURE — 80053 COMPREHEN METABOLIC PANEL: CPT

## 2021-09-30 PROCEDURE — 76856 US EXAM PELVIC COMPLETE: CPT

## 2021-09-30 PROCEDURE — 85025 COMPLETE CBC W/AUTO DIFF WBC: CPT

## 2021-09-30 PROCEDURE — 80047 BASIC METABLC PNL IONIZED CA: CPT

## 2021-09-30 PROCEDURE — 81001 URINALYSIS AUTO W/SCOPE: CPT

## 2021-09-30 NOTE — ED TRIAGE NOTES
Pt arrives to ED with complaints of vaginal bleeding that started this morning. Pt saw a clot and dark blood in her pad today. Pt told by PCP to come for blood work and evaluation.  Pt denies SOB, CP.

## 2021-09-30 NOTE — ED NOTES
6:53 PM    [de-identified] yo F with vaginal bleeding with onset today and slight pelvic cramping. Sent here by her PCP. I have evaluated the patient as the Provider in Triage. I have reviewed Her vital signs and the triage nurse assessment. I have talked with the patient and any available family and advised that I am the provider in triage and have ordered the appropriate study to initiate their work up based on the clinical presentation during my assessment. I have advised that the patient will be accommodated in the Main ED as soon as possible. I have also requested to contact the triage nurse or myself immediately if the patient experiences any changes in their condition during this brief waiting period.   Rikki Ormond, PA

## 2021-10-01 VITALS
BODY MASS INDEX: 42.59 KG/M2 | HEART RATE: 71 BPM | OXYGEN SATURATION: 92 % | WEIGHT: 265 LBS | RESPIRATION RATE: 20 BRPM | HEIGHT: 66 IN | DIASTOLIC BLOOD PRESSURE: 67 MMHG | TEMPERATURE: 98 F | SYSTOLIC BLOOD PRESSURE: 155 MMHG

## 2021-10-01 NOTE — ED NOTES
Verbal shift change report given to Frieda Bernheim (oncoming nurse) by Tomy Donaldson (offgoing nurse). Report included the following information SBAR, ED Summary, Intake/Output, MAR and Recent Results.

## 2021-10-01 NOTE — ED PROVIDER NOTES
60-year-old female presenting ER with report of vaginal bleeding. Patient reports that today after using the restroom when she was wiping noticed that there is some blood on the tissue paper. But no blood in her urine. She wore a pad and noticed some dark red blood on the pad later in the day. Called her doctor advised her to come in for evaluation. Patient reports some mild abdominal cramping but no abdominal pain. No nausea vomiting diarrhea constipation denies any blood or black-colored stools. No history of cancer. Denies any weight gain or weight loss. Denies any other symptoms. Past Medical History:   Diagnosis Date    Anxiety     GERD (gastroesophageal reflux disease)     Hiatal hernia     Hypertension     Other ill-defined conditions(634.13) incont.  Psychiatric disorder     Sleep disorder        Past Surgical History:   Procedure Laterality Date    HX ORTHOPAEDIC  ankle    HX UROLOGICAL  bladder         No family history on file. Social History     Socioeconomic History    Marital status:      Spouse name: Not on file    Number of children: Not on file    Years of education: Not on file    Highest education level: Not on file   Occupational History    Not on file   Tobacco Use    Smoking status: Never Smoker    Smokeless tobacco: Never Used   Substance and Sexual Activity    Alcohol use: No    Drug use: No    Sexual activity: Not on file   Other Topics Concern    Not on file   Social History Narrative    Not on file     Social Determinants of Health     Financial Resource Strain:     Difficulty of Paying Living Expenses:    Food Insecurity:     Worried About Running Out of Food in the Last Year:     [address] in the Last Year:    Transportation Needs:     Lack of Transportation (Medical):      Lack of Transportation (Non-Medical):    Physical Activity:     Days of Exercise per Week:     Minutes of Exercise per Session:    Stress:     Feeling of Stress :    Social Connections:     Frequency of Communication with Friends and Family:     Frequency of Social Gatherings with Friends and Family:     Attends Buddhist Services:     Active Member of Clubs or Organizations:     Attends Club or Organization Meetings:     Marital Status:    Intimate Partner Violence:     Fear of Current or Ex-Partner:     Emotionally Abused:     Physically Abused:     Sexually Abused: ALLERGIES: Codeine and Prednisone    Review of Systems   Constitutional: Negative for chills and fever. HENT: Negative for congestion and sore throat. Eyes: Negative for pain. Respiratory: Negative for shortness of breath. Cardiovascular: Negative for chest pain. Gastrointestinal: Negative for abdominal pain, diarrhea, nausea and vomiting. Genitourinary: Positive for vaginal bleeding. Negative for dysuria, flank pain, pelvic pain and vaginal pain. Musculoskeletal: Negative for back pain and neck pain. Skin: Negative for rash. Neurological: Negative for dizziness and headaches. All other systems reviewed and are negative. Vitals:    09/30/21 1854 09/30/21 2252 09/30/21 2345   BP: (!) 182/89 (!) 159/73 (!) 155/67   Pulse: 71     Resp: 20     Temp: 98 °F (36.7 °C)     SpO2: 94% 98% 92%   Weight: 120.2 kg (265 lb)     Height: 5' 6\" (1.676 m)              Physical Exam  Constitutional:       Appearance: She is well-developed. HENT:      Head: Normocephalic. Nose: Nose normal.      Mouth/Throat:      Pharynx: Oropharynx is clear. Eyes:      Conjunctiva/sclera: Conjunctivae normal.   Cardiovascular:      Rate and Rhythm: Normal rate and regular rhythm. Pulmonary:      Effort: Pulmonary effort is normal. No respiratory distress. Breath sounds: Normal breath sounds. Abdominal:      General: Bowel sounds are normal.      Palpations: Abdomen is soft. Tenderness: There is no abdominal tenderness.    Musculoskeletal:         General: Normal range of motion. Cervical back: Normal range of motion and neck supple. Skin:     General: Skin is warm. Capillary Refill: Capillary refill takes less than 2 seconds. Findings: No rash. Neurological:      Mental Status: She is alert and oriented to person, place, and time. Comments: No gross motor or sensory deficits          MDM  Number of Diagnoses or Management Options  Postmenopausal bleeding  Diagnosis management comments: Patient presenting with vaginal bleeding and light of post menopause. Thickened endometrium. I discussed with patient concern for cancer and to proven otherwise and the need to follow-up with her OB/GYN. No other lab abnormalities. Patient stable for discharge. Again stressed the importance of following up with her OB/GYN. Amount and/or Complexity of Data Reviewed  Clinical lab tests: reviewed  Tests in the radiology section of CPT®: reviewed      ED Course as of Oct 01 0228   Thu Sep 30, 2021   3100 Potassium: 4.4 [ZD]      ED Course User Index  [ZD] Barbara Kam MD       Procedures    Recent Results (from the past 24 hour(s))   CBC WITH AUTOMATED DIFF    Collection Time: 09/30/21  7:47 PM   Result Value Ref Range    WBC 9.4 3.6 - 11.0 K/uL    RBC 4.92 3.80 - 5.20 M/uL    HGB 15.5 11.5 - 16.0 g/dL    HCT 44.6 35.0 - 47.0 %    MCV 90.7 80.0 - 99.0 FL    MCH 31.5 26.0 - 34.0 PG    MCHC 34.8 30.0 - 36.5 g/dL    RDW 13.0 11.5 - 14.5 %    PLATELET 227 781 - 142 K/uL    MPV 11.5 8.9 - 12.9 FL    NRBC 0.0 0  WBC    ABSOLUTE NRBC 0.00 0.00 - 0.01 K/uL    NEUTROPHILS 39 32 - 75 %    LYMPHOCYTES 44 12 - 49 %    MONOCYTES 11 5 - 13 %    EOSINOPHILS 5 0 - 7 %    BASOPHILS 1 0 - 1 %    IMMATURE GRANULOCYTES 0 0.0 - 0.5 %    ABS. NEUTROPHILS 3.7 1.8 - 8.0 K/UL    ABS. LYMPHOCYTES 4.2 (H) 0.8 - 3.5 K/UL    ABS. MONOCYTES 1.0 0.0 - 1.0 K/UL    ABS. EOSINOPHILS 0.4 0.0 - 0.4 K/UL    ABS. BASOPHILS 0.1 0.0 - 0.1 K/UL    ABS. IMM.  GRANS. 0.0 0.00 - 0.04 K/UL    DF AUTOMATED     URINALYSIS W/MICROSCOPIC    Collection Time: 09/30/21  7:47 PM   Result Value Ref Range    Color YELLOW/STRAW      Appearance CLEAR CLEAR      Specific gravity 1.009 1.003 - 1.030      pH (UA) 7.0 5.0 - 8.0      Protein Negative NEG mg/dL    Glucose Negative NEG mg/dL    Ketone Negative NEG mg/dL    Bilirubin Negative NEG      Blood LARGE (A) NEG      Urobilinogen 1.0 0.2 - 1.0 EU/dL    Nitrites Negative NEG      Leukocyte Esterase Negative NEG      WBC 0-4 0 - 4 /hpf    RBC  0 - 5 /hpf    Epithelial cells FEW FEW /lpf    Bacteria Negative NEG /hpf    Hyaline cast 0-2 0 - 5 /lpf   URINE CULTURE HOLD SAMPLE    Collection Time: 09/30/21  7:47 PM    Specimen: Serum; Urine   Result Value Ref Range    Urine culture hold        Urine on hold in Microbiology dept for 2 days. If unpreserved urine is submitted, it cannot be used for addtional testing after 24 hours, recollection will be required. POC CHEM8    Collection Time: 09/30/21 10:50 PM   Result Value Ref Range    Calcium, ionized (POC) 1.09 (L) 1.12 - 1.32 mmol/L    Sodium (POC) 132 (L) 136 - 145 mmol/L    Potassium (POC) 7.2 (HH) 3.5 - 5.1 mmol/L    Chloride (POC) 98 98 - 107 mmol/L    CO2 (POC) 26.5 21 - 32 mmol/L    Anion gap (POC) 9 (L) 10 - 20 mmol/L    Glucose (POC) 108 (H) 65 - 100 mg/dL    Creatinine (POC) 1.05 0.6 - 1.3 mg/dL    GFRAA, POC >60 >60 ml/min/1.73m2    GFRNA, POC 50 (L) >60 ml/min/1.73m2    Comment Comment Not Indicated.       Critical value read back RAVINDRA    METABOLIC PANEL, COMPREHENSIVE    Collection Time: 09/30/21 11:01 PM   Result Value Ref Range    Sodium 132 (L) 136 - 145 mmol/L    Potassium 4.4 3.5 - 5.1 mmol/L    Chloride 98 97 - 108 mmol/L    CO2 26 21 - 32 mmol/L    Anion gap 8 5 - 15 mmol/L    Glucose 98 65 - 100 mg/dL    BUN 20 6 - 20 MG/DL    Creatinine 1.07 (H) 0.55 - 1.02 MG/DL    BUN/Creatinine ratio 19 12 - 20      GFR est AA 60 (L) >60 ml/min/1.73m2    GFR est non-AA 49 (L) >60 ml/min/1.73m2    Calcium 9.2 8.5 - 10.1 MG/DL    Bilirubin, total 0.6 0.2 - 1.0 MG/DL    ALT (SGPT) 28 12 - 78 U/L    AST (SGOT) 30 15 - 37 U/L    Alk. phosphatase 85 45 - 117 U/L    Protein, total 8.2 6.4 - 8.2 g/dL    Albumin 3.8 3.5 - 5.0 g/dL    Globulin 4.4 (H) 2.0 - 4.0 g/dL    A-G Ratio 0.9 (L) 1.1 - 2.2         US PELV NON OBS    Result Date: 9/30/2021  EXAM:  ULTRASOUND FEMALE PELVIS TRANSABDOMINAL AND TRANSVAGINAL INDICATION: [de-identified] female with vaginal bleeding. . COMPARISON: None. TRANSABDOMINAL TECHNIQUE: Real-time sonography of the pelvis was performed using the urine filled bladder as an acoustic window. Multiple static images of the uterus and ovaries were obtained. FINDINGS: UTERUS: The uterus is normal in size and echotexture and measures 5.2 x 2.1 x 2.8 cm. ENDOMETRIUM: The endometrial stripe cannot be measured because of the bladder underdistention. Clenton Reas RIGHT OVARY: The right ovary is not visualized because of position LEFT OVARY: The left ovary is not visualized because of position CUL-DE-SAC: There is no mass or fluid or other abnormality in the adnexa or cul-de-sac. Nonvisualization of the endometrium. Transvaginal sonography will be performed to better evaluate. TRANSVAGINAL TECHNIQUE: Transvaginal sonography was performed with multiple static images of the uterus and ovaries obtained. FINDINGS: UTERUS: The uterus is normal. The uterus measures 5.1 x 2.3 x 3.0 cm. . ENDOMETRIUM: The endometrial stripe is thickened at 12 mm and contains multiple punctate calcifications. RIGHT OVARY: The right ovary is normal. The right ovary measures 1.2 x 0.8 x 1.2 cm. There is normal color flow to the right ovary. LEFT OVARY: The left ovary measures 2.7 x 2.4 x 2.6 cm, and contains a 2.3 x 2.1 x 2.1 cm cyst with internal echoes There is normal color flow to the left ovary. CUL-DE-SAC: There is no fluid or mass or other abnormality in the pelvic cul-de-sac. IMPRESSION: Thickened endometrium with calcifications.  Consider dysplasia or metaplasia. Recommend pathologic correlation. Complex 2.3 cm left ovarian cyst for which follow-up is needed.

## 2021-12-01 ENCOUNTER — OFFICE VISIT (OUTPATIENT)
Dept: SURGERY | Age: 81
End: 2021-12-01
Payer: MEDICARE

## 2021-12-01 ENCOUNTER — HOSPITAL ENCOUNTER (OUTPATIENT)
Dept: MAMMOGRAPHY | Age: 81
Discharge: HOME | End: 2021-12-01
Payer: MEDICARE

## 2021-12-01 VITALS
SYSTOLIC BLOOD PRESSURE: 185 MMHG | HEART RATE: 67 BPM | DIASTOLIC BLOOD PRESSURE: 89 MMHG | WEIGHT: 260 LBS | BODY MASS INDEX: 41.97 KG/M2

## 2021-12-01 DIAGNOSIS — R92.8 ABNORMAL MAMMOGRAM: ICD-10-CM

## 2021-12-01 DIAGNOSIS — N60.92 ATYPICAL DUCTAL HYPERPLASIA OF LEFT BREAST: Primary | ICD-10-CM

## 2021-12-01 PROCEDURE — G8400 PT W/DXA NO RESULTS DOC: HCPCS | Performed by: SURGERY

## 2021-12-01 PROCEDURE — G8432 DEP SCR NOT DOC, RNG: HCPCS | Performed by: SURGERY

## 2021-12-01 PROCEDURE — 77061 BREAST TOMOSYNTHESIS UNI: CPT

## 2021-12-01 PROCEDURE — 1090F PRES/ABSN URINE INCON ASSESS: CPT | Performed by: SURGERY

## 2021-12-01 PROCEDURE — G8417 CALC BMI ABV UP PARAM F/U: HCPCS | Performed by: SURGERY

## 2021-12-01 PROCEDURE — G8427 DOCREV CUR MEDS BY ELIG CLIN: HCPCS | Performed by: SURGERY

## 2021-12-01 PROCEDURE — G8536 NO DOC ELDER MAL SCRN: HCPCS | Performed by: SURGERY

## 2021-12-01 PROCEDURE — 1101F PT FALLS ASSESS-DOCD LE1/YR: CPT | Performed by: SURGERY

## 2021-12-01 PROCEDURE — 99213 OFFICE O/P EST LOW 20 MIN: CPT | Performed by: SURGERY

## 2021-12-01 NOTE — PROGRESS NOTES
HISTORY OF PRESENT ILLNESS  Rome Cast is a [de-identified] y.o. female. HPI  ESTABLISHED patient  Here for 1 year follow for LEFT breast atypical micropapillary ductal hyperplasia. Lucas Valente shortness of breath. 04/27/21: LEFT breast needle core bx. PATH: Atypical micropapillary ductal hyperplasia. Fibrocystic changes including lobular sclerosis and microcalcification.       ROS       Physical Exam    ASSESSMENT and PLAN  {ASSESSMENT/PLAN:68976}

## 2021-12-01 NOTE — PROGRESS NOTES
HISTORY OF PRESENT ILLNESS  Rika Rojas is a [de-identified] y.o. female. HPI      04/27/21: LEFT breast needle core bx. PATH: Atypical micropapillary ductal hyperplasia. Fibrocystic changes including lobular sclerosis and microcalcification. Past Medical History:   Diagnosis Date    Anxiety     GERD (gastroesophageal reflux disease)     Hiatal hernia     Hypertension     Other ill-defined conditions(859.89) incont.  Psychiatric disorder     Sleep disorder        Past Surgical History:   Procedure Laterality Date    HX ORTHOPAEDIC  ankle    HX UROLOGICAL  bladder       Social History     Socioeconomic History    Marital status:      Spouse name: Not on file    Number of children: Not on file    Years of education: Not on file    Highest education level: Not on file   Occupational History    Not on file   Tobacco Use    Smoking status: Never Smoker    Smokeless tobacco: Never Used   Substance and Sexual Activity    Alcohol use: No    Drug use: No    Sexual activity: Not on file   Other Topics Concern    Not on file   Social History Narrative    Not on file     Social Determinants of Health     Financial Resource Strain:     Difficulty of Paying Living Expenses: Not on file   Food Insecurity:     Worried About Running Out of Food in the Last Year: Not on file    Marco A of Food in the Last Year: Not on file   Transportation Needs:     Lack of Transportation (Medical): Not on file    Lack of Transportation (Non-Medical):  Not on file   Physical Activity:     Days of Exercise per Week: Not on file    Minutes of Exercise per Session: Not on file   Stress:     Feeling of Stress : Not on file   Social Connections:     Frequency of Communication with Friends and Family: Not on file    Frequency of Social Gatherings with Friends and Family: Not on file    Attends Protestant Services: Not on file    Active Member of Clubs or Organizations: Not on file    Attends Club or Organization Meetings: Not on file    Marital Status: Not on file   Intimate Partner Violence:     Fear of Current or Ex-Partner: Not on file    Emotionally Abused: Not on file    Physically Abused: Not on file    Sexually Abused: Not on file   Housing Stability:     Unable to Pay for Housing in the Last Year: Not on file    Number of [place] in the Last Year: Not on file    Unstable Housing in the Last Year: Not on file       Current Outpatient Medications on File Prior to Visit   Medication Sig Dispense Refill    valsartan (DIOVAN) 320 mg tablet Take 320 mg by mouth daily.  amLODIPine (NORVASC) 5 mg tablet TAKE 1 TABLET BY MOUTH EVERY DAY      ergocalciferol (Vitamin D2) 1,250 mcg (50,000 unit) capsule Take 50,000 Units by mouth.  calcium-magnesium 300-300 mg tab Take  by mouth.  ciprofloxacin HCl (CIPRO) 500 mg tablet Take 500 mg by mouth two (2) times a day. (Patient not taking: Reported on 5/19/2021)      RABEprazole (ACIPHEX) 20 mg tablet Take 20 mg by mouth daily. (Patient not taking: Reported on 5/19/2021)      AMLODIPINE/VALSARTAN (EXFORGE PO) Take  by mouth. (Patient not taking: Reported on 5/19/2021)       No current facility-administered medications on file prior to visit. Allergies   Allergen Reactions    Codeine Unknown (comments)    Prednisone Hives       OB History    No obstetric history on file. Obstetric Comments   Menarche 6, LMP -, # of children 0, age of 1st delivery -, Hysterectomy/oophorectomy no/no, Breast bx yes, history of breast feeding no, BCP no, Hormone therapy no             ROS      Physical Exam  ***Imaging & Procedures      ASSESSMENT and PLAN  {No Diagnosis Found}  Patient presents to follow up on LEFT breast atypical micropapillary ductal hyperplasia***, and is doing well overall. F/U as PRN. This plan was reviewed with the patient and patient agrees. All questions were answered.     ***HPI NOTES: mammogram looks normal,     ***DAX NOTES: kamari mm is nomrl a fllw yao in April, follw up with Dr. Gerson Ashraf as PRN    ***UPDATE PHYSICAL EXAM***    Total time spent was 20 minutes.     Written by Valeta Route, as dictated by Dr. Lita Briceno MD.  ***TO-DO LIST: ***  Order 3D Mammogram

## 2021-12-01 NOTE — PATIENT INSTRUCTIONS
Breast Pain: Care Instructions  Your Care Instructions     Breast tenderness and pain may come and go with your monthly periods (cyclic), or it may not follow any pattern (noncyclic). Breast pain is rarely caused by a serious health problem. You may need tests to find the cause. Follow-up care is a key part of your treatment and safety. Be sure to make and go to all appointments, and call your doctor if you are having problems. It's also a good idea to know your test results and keep a list of the medicines you take. How can you care for yourself at home? · If your doctor gave you medicine, take it exactly as prescribed. Call your doctor if you think you are having a problem with your medicine. · Take an over-the-counter pain medicine, such as acetaminophen (Tylenol), ibuprofen (Advil, Motrin), or naproxen (Aleve), to relieve pain and swelling. Read and follow all instructions on the label. · Do not take two or more pain medicines at the same time unless the doctor told you to. Many pain medicines have acetaminophen, which is Tylenol. Too much acetaminophen (Tylenol) can be harmful. · Wear a supportive bra, such as a sports bra or a jog bra. · Cut down on the amount of fat in your diet. If you need help planning healthy meals, see a dietitian. · Get at least 30 minutes of exercise on most days of the week. Walking is a good choice. You also may want to do other activities, such as running, swimming, cycling, or playing tennis or team sports. · Keep a healthy sleep pattern. Go to bed at the same time every night, and get up at the same time every day. When should you call for help? Call your doctor now or seek immediate medical care if:    · You have new changes in a breast, such as:  ? A lump or thickening in your breast or armpit. ? A change in the breast's size or shape. ? Skin changes, such as dimples or puckers. ? Nipple discharge.   ? A change in the color or feel of the skin of your breast or the darker area around the nipple (areola). ? A change in the shape of the nipple (it may look like it's being pulled into the breast).     · You have symptoms of a breast infection, such as:  ? Increased pain, swelling, redness, or warmth around a breast.  ? Red streaks extending from the breast.  ? Pus draining from a breast.  ? A fever. Watch closely for changes in your health, and be sure to contact your doctor if:    · Your breast pain does not get better after 1 week.     · You have a lump or thickening in your breast or armpit. Where can you learn more? Go to http://www.gray.com/  Enter Z395 in the search box to learn more about \"Breast Pain: Care Instructions. \"  Current as of: July 1, 2021               Content Version: 13.0  © 0283-2717 [employer]. Care instructions adapted under license by [email or web address] (which disclaims liability or warranty for this information). If you have questions about a medical condition or this instruction, always ask your healthcare professional. [address] any warranty or liability for your use of this information.

## 2021-12-10 NOTE — PROGRESS NOTES
HISTORY OF PRESENT ILLNESS  Chelly Antonio is a [de-identified] y.o. female. HPI  ESTABLISHED patient here for 1 year follow up for LEFT breast atypical micropapillary ductal hyperplasia. Pt notes shortness of breath.      04/27/21: LEFT breast needle core bx. PATH: Atypical micropapillary ductal hyperplasia. Fibrocystic changes including lobular sclerosis and microcalcification. [facility] Results (most recent):  Results from [place] encounter on 12/01/21    [facility] 3D DAREK W MAMMO LT DX INCL CAD    Narrative  STUDY: Left Digital Diagnostic Mammogram including 3-D Tomosynthesis    INDICATION:  Diagnostic evaluation, stereotactic biopsy of calcifications in  April 2021 with fibrocystic changes and microcalcifications, as well as atypical  micropapillary ductal hyperplasia. Patient presents for short-term follow-up    COMPARISON:  Prior studies dating back to 2018    BREAST COMPOSITION: There are scattered areas of fibroglandular density. FINDINGS: Left digital diagnostic mammography was performed, and is interpreted  in conjunction with a computer assisted detection (CAD) system. In addition to  left 2-D bilateral CC and MLO views, bilateral 3-D/tomosynthesis was performed  in CC and MLO projections. Additionally, left magnification views were  performed. There is a biopsy clip in the mid to posterior third of the left breast upper  outer quadrant. There are little to no residual microcalcifications present. There has been no significant change. The stereotactic biopsy removed  essentially all of the microcalcifications. No suspicious masses or  calcifications are identified. There is no skin thickening or nipple retraction. Impression  1. BI-RADS Assessment Category 2: Benign finding. Biopsy clip in the left  breast, region of stereotactic biopsy in April 2021. The stereotactic biopsy  removed essentially all of the targeted microcalcifications.  There are no  suspicious findings in the region of the biopsy. 2. No mammographic evidence of malignancy within the left breast.    Recommend annual screening mammography. Patient is due for this in April 2022. The patient has been notified of these results. Past Medical History:   Diagnosis Date    Anxiety     Atypical hyperplasia of breast 04/27/2021    Stereo    GERD (gastroesophageal reflux disease)     Hiatal hernia     Hypertension     Other ill-defined conditions(799.89) incont.  Psychiatric disorder     Sleep disorder        Past Surgical History:   Procedure Laterality Date    HX BREAST BIOPSY Left 04/27/2021    Stereo- Atypical Micropappillary ductal hyperplasia    HX ORTHOPAEDIC  ankle    HX UROLOGICAL  bladder       Social History     Socioeconomic History    Marital status:      Spouse name: Not on file    Number of children: Not on file    Years of education: Not on file    Highest education level: Not on file   Occupational History    Not on file   Tobacco Use    Smoking status: Never Smoker    Smokeless tobacco: Never Used   Substance and Sexual Activity    Alcohol use: No    Drug use: No    Sexual activity: Not on file   Other Topics Concern    Not on file   Social History Narrative    Not on file     Social Determinants of Health     Financial Resource Strain:     Difficulty of Paying Living Expenses: Not on file   Food Insecurity:     Worried About Running Out of Food in the Last Year: Not on file    Marco A of Food in the Last Year: Not on file   Transportation Needs:     Lack of Transportation (Medical): Not on file    Lack of Transportation (Non-Medical):  Not on file   Physical Activity:     Days of Exercise per Week: Not on file    Minutes of Exercise per Session: Not on file   Stress:     Feeling of Stress : Not on file   Social Connections:     Frequency of Communication with Friends and Family: Not on file    Frequency of Social Gatherings with Friends and Family: Not on file    Attends Orthodoxy Services: Not on file    Active Member of Clubs or Organizations: Not on file    Attends Club or Organization Meetings: Not on file    Marital Status: Not on file   Intimate Partner Violence:     Fear of Current or Ex-Partner: Not on file    Emotionally Abused: Not on file    Physically Abused: Not on file    Sexually Abused: Not on file   Housing Stability:     Unable to Pay for Housing in the Last Year: Not on file    Number of [place] in the Last Year: Not on file    Unstable Housing in the Last Year: Not on file       Current Outpatient Medications on File Prior to Visit   Medication Sig Dispense Refill    valsartan (DIOVAN) 320 mg tablet Take 320 mg by mouth daily.  amLODIPine (NORVASC) 5 mg tablet TAKE 1 TABLET BY MOUTH EVERY DAY      ergocalciferol (Vitamin D2) 1,250 mcg (50,000 unit) capsule Take 50,000 Units by mouth.  calcium-magnesium 300-300 mg tab Take  by mouth.  ciprofloxacin HCl (CIPRO) 500 mg tablet Take 500 mg by mouth two (2) times a day. (Patient not taking: Reported on 2021)      RABEprazole (ACIPHEX) 20 mg tablet Take 20 mg by mouth daily. (Patient not taking: Reported on 2021)      AMLODIPINE/VALSARTAN (EXFORGE PO) Take  by mouth. (Patient not taking: Reported on 2021)       No current facility-administered medications on file prior to visit. Allergies   Allergen Reactions    Codeine Unknown (comments)    Prednisone Hives       OB History             Para        Term   0            AB        Living           SAB        IAB        Ectopic        Molar        Multiple        Live Births   0          Obstetric Comments   Menarche 6, LMP -, # of children 0, age of 1st delivery -, Hysterectomy/oophorectomy no/no, Breast bx yes, history of breast feeding no, BCP no, Hormone therapy no             ROS      Physical Exam      ASSESSMENT and PLAN    ICD-10-CM ICD-9-CM    1.  Atypical ductal hyperplasia of left breast N60.92 610.8      Patient presents to follow up on LEFT breast atypical micropapillary ductal hyperplasia, and is doing well overall. No exam today, reviewed mammogram results. Because mammogram is benign and without any other abnormalities, will forgo surgery. Continue with BL mammography in April. F/U PRN. This plan was reviewed with the patient and patient agrees. All questions were answered. Total time spent was 20 minutes.     Written by Adela Diop, as dictated by Dr. Sharyn Flowers MD.

## 2022-04-13 ENCOUNTER — TRANSCRIBE ORDER (OUTPATIENT)
Dept: SCHEDULING | Age: 82
End: 2022-04-13

## 2022-04-13 DIAGNOSIS — Z12.31 SCREENING MAMMOGRAM FOR HIGH-RISK PATIENT: Primary | ICD-10-CM

## 2022-05-03 ENCOUNTER — HOSPITAL ENCOUNTER (OUTPATIENT)
Dept: MAMMOGRAPHY | Age: 82
Discharge: HOME | End: 2022-05-03
Attending: FAMILY MEDICINE
Payer: MEDICARE

## 2022-05-03 DIAGNOSIS — Z12.31 SCREENING MAMMOGRAM FOR HIGH-RISK PATIENT: ICD-10-CM

## 2022-05-03 PROCEDURE — 77063 BREAST TOMOSYNTHESIS BI: CPT

## 2022-05-17 ENCOUNTER — TRANSCRIBE ORDER (OUTPATIENT)
Dept: SCHEDULING | Age: 82
End: 2022-05-17

## 2022-05-17 DIAGNOSIS — M17.0 PRIMARY OSTEOARTHRITIS OF BOTH KNEES: ICD-10-CM

## 2022-05-17 DIAGNOSIS — M25.561 RIGHT KNEE PAIN: Primary | ICD-10-CM

## 2022-05-26 ENCOUNTER — HOSPITAL ENCOUNTER (OUTPATIENT)
Dept: MRI IMAGING | Age: 82
Discharge: HOME | End: 2022-05-26
Attending: FAMILY MEDICINE
Payer: MEDICARE

## 2022-05-26 DIAGNOSIS — M25.561 RIGHT KNEE PAIN: ICD-10-CM

## 2022-05-26 DIAGNOSIS — M17.0 PRIMARY OSTEOARTHRITIS OF BOTH KNEES: ICD-10-CM

## 2022-05-26 PROCEDURE — 73721 MRI JNT OF LWR EXTRE W/O DYE: CPT

## 2023-04-22 ENCOUNTER — TRANSCRIBE ORDERS (OUTPATIENT)
Facility: HOSPITAL | Age: 83
End: 2023-04-22

## 2023-04-22 DIAGNOSIS — Z12.31 VISIT FOR SCREENING MAMMOGRAM: Primary | ICD-10-CM

## 2023-05-19 ENCOUNTER — HOSPITAL ENCOUNTER (OUTPATIENT)
Facility: HOSPITAL | Age: 83
End: 2023-05-19
Payer: MEDICARE

## 2023-05-19 DIAGNOSIS — Z12.31 VISIT FOR SCREENING MAMMOGRAM: ICD-10-CM

## 2023-05-19 PROCEDURE — 77067 SCR MAMMO BI INCL CAD: CPT

## 2023-06-21 ENCOUNTER — APPOINTMENT (OUTPATIENT)
Facility: HOSPITAL | Age: 83
End: 2023-06-21
Payer: MEDICARE

## 2023-06-21 ENCOUNTER — HOSPITAL ENCOUNTER (EMERGENCY)
Facility: HOSPITAL | Age: 83
Discharge: HOME | End: 2023-06-21
Attending: EMERGENCY MEDICINE
Payer: MEDICARE

## 2023-06-21 VITALS
HEIGHT: 64 IN | HEART RATE: 71 BPM | RESPIRATION RATE: 16 BRPM | WEIGHT: 231 LBS | SYSTOLIC BLOOD PRESSURE: 118 MMHG | BODY MASS INDEX: 39.44 KG/M2 | OXYGEN SATURATION: 94 % | TEMPERATURE: 97.4 F | DIASTOLIC BLOOD PRESSURE: 74 MMHG

## 2023-06-21 DIAGNOSIS — E27.8 ADRENAL MASS (HCC): ICD-10-CM

## 2023-06-21 DIAGNOSIS — K80.20 CALCULUS OF GALLBLADDER WITHOUT CHOLECYSTITIS WITHOUT OBSTRUCTION: Primary | ICD-10-CM

## 2023-06-21 DIAGNOSIS — E04.1 THYROID NODULE: ICD-10-CM

## 2023-06-21 DIAGNOSIS — R07.9 CHEST PAIN, UNSPECIFIED TYPE: ICD-10-CM

## 2023-06-21 DIAGNOSIS — K44.9 HIATAL HERNIA: ICD-10-CM

## 2023-06-21 LAB
ALBUMIN SERPL-MCNC: 3.3 G/DL (ref 3.5–5)
ALBUMIN/GLOB SERPL: 0.8 (ref 1.1–2.2)
ALP SERPL-CCNC: 100 U/L (ref 45–117)
ALT SERPL-CCNC: 28 U/L (ref 12–78)
ANION GAP SERPL CALC-SCNC: 5 MMOL/L (ref 5–15)
AST SERPL-CCNC: 30 U/L (ref 15–37)
BASOPHILS # BLD: 0.1 K/UL (ref 0–0.1)
BASOPHILS NFR BLD: 1 % (ref 0–1)
BILIRUB DIRECT SERPL-MCNC: 0.1 MG/DL (ref 0–0.2)
BILIRUB SERPL-MCNC: 0.7 MG/DL (ref 0.2–1)
BUN SERPL-MCNC: 14 MG/DL (ref 6–20)
BUN/CREAT SERPL: 15 (ref 12–20)
CALCIUM SERPL-MCNC: 9.4 MG/DL (ref 8.5–10.1)
CHLORIDE SERPL-SCNC: 106 MMOL/L (ref 97–108)
CO2 SERPL-SCNC: 25 MMOL/L (ref 21–32)
COMMENT:: NORMAL
CREAT SERPL-MCNC: 0.93 MG/DL (ref 0.55–1.02)
DIFFERENTIAL METHOD BLD: ABNORMAL
EOSINOPHIL # BLD: 0.3 K/UL (ref 0–0.4)
EOSINOPHIL NFR BLD: 4 % (ref 0–7)
ERYTHROCYTE [DISTWIDTH] IN BLOOD BY AUTOMATED COUNT: 12.7 % (ref 11.5–14.5)
GLOBULIN SER CALC-MCNC: 4.3 G/DL (ref 2–4)
GLUCOSE SERPL-MCNC: 128 MG/DL (ref 65–100)
HCT VFR BLD AUTO: 48.1 % (ref 35–47)
HGB BLD-MCNC: 16.5 G/DL (ref 11.5–16)
IMM GRANULOCYTES # BLD AUTO: 0 K/UL (ref 0–0.04)
IMM GRANULOCYTES NFR BLD AUTO: 0 % (ref 0–0.5)
LIPASE SERPL-CCNC: 146 U/L (ref 73–393)
LYMPHOCYTES # BLD: 3.3 K/UL (ref 0.8–3.5)
LYMPHOCYTES NFR BLD: 40 % (ref 12–49)
MCH RBC QN AUTO: 31.2 PG (ref 26–34)
MCHC RBC AUTO-ENTMCNC: 34.3 G/DL (ref 30–36.5)
MCV RBC AUTO: 90.9 FL (ref 80–99)
MONOCYTES # BLD: 0.7 K/UL (ref 0–1)
MONOCYTES NFR BLD: 9 % (ref 5–13)
NEUTS SEG # BLD: 3.8 K/UL (ref 1.8–8)
NEUTS SEG NFR BLD: 46 % (ref 32–75)
NRBC # BLD: 0 K/UL (ref 0–0.01)
NRBC BLD-RTO: 0 PER 100 WBC
PLATELET # BLD AUTO: 206 K/UL (ref 150–400)
PMV BLD AUTO: 11.1 FL (ref 8.9–12.9)
POTASSIUM SERPL-SCNC: 4 MMOL/L (ref 3.5–5.1)
PROT SERPL-MCNC: 7.6 G/DL (ref 6.4–8.2)
RBC # BLD AUTO: 5.29 M/UL (ref 3.8–5.2)
SODIUM SERPL-SCNC: 136 MMOL/L (ref 136–145)
SPECIMEN HOLD: NORMAL
TROPONIN I SERPL HS-MCNC: 7 NG/L (ref 0–51)
TROPONIN I SERPL HS-MCNC: 8 NG/L (ref 0–51)
WBC # BLD AUTO: 8.2 K/UL (ref 3.6–11)

## 2023-06-21 PROCEDURE — 36415 COLL VENOUS BLD VENIPUNCTURE: CPT

## 2023-06-21 PROCEDURE — 71275 CT ANGIOGRAPHY CHEST: CPT

## 2023-06-21 PROCEDURE — 71045 X-RAY EXAM CHEST 1 VIEW: CPT

## 2023-06-21 PROCEDURE — 80048 BASIC METABOLIC PNL TOTAL CA: CPT

## 2023-06-21 PROCEDURE — 99285 EMERGENCY DEPT VISIT HI MDM: CPT

## 2023-06-21 PROCEDURE — 80076 HEPATIC FUNCTION PANEL: CPT

## 2023-06-21 PROCEDURE — 85025 COMPLETE CBC W/AUTO DIFF WBC: CPT

## 2023-06-21 PROCEDURE — 83690 ASSAY OF LIPASE: CPT

## 2023-06-21 PROCEDURE — 93005 ELECTROCARDIOGRAM TRACING: CPT | Performed by: EMERGENCY MEDICINE

## 2023-06-21 PROCEDURE — 84484 ASSAY OF TROPONIN QUANT: CPT

## 2023-06-21 PROCEDURE — 6360000004 HC RX CONTRAST MEDICATION: Performed by: EMERGENCY MEDICINE

## 2023-06-21 RX ADMIN — IOPAMIDOL 100 ML: 755 INJECTION, SOLUTION INTRAVENOUS at 10:35

## 2023-06-21 ASSESSMENT — PAIN DESCRIPTION - ORIENTATION: ORIENTATION: RIGHT;MID

## 2023-06-21 ASSESSMENT — ENCOUNTER SYMPTOMS
SHORTNESS OF BREATH: 0
ABDOMINAL DISTENTION: 0
DIARRHEA: 0
BLOOD IN STOOL: 0
COUGH: 0
ANAL BLEEDING: 0
ABDOMINAL PAIN: 0
VOMITING: 0
NAUSEA: 1

## 2023-06-21 ASSESSMENT — PAIN DESCRIPTION - LOCATION: LOCATION: CHEST

## 2023-06-21 ASSESSMENT — PAIN DESCRIPTION - DESCRIPTORS: DESCRIPTORS: ACHING

## 2023-06-21 ASSESSMENT — PAIN - FUNCTIONAL ASSESSMENT: PAIN_FUNCTIONAL_ASSESSMENT: 0-10

## 2023-06-21 ASSESSMENT — PAIN SCALES - GENERAL: PAINLEVEL_OUTOF10: 1

## 2023-06-21 NOTE — ED PROVIDER NOTES
Basophils Absolute 06/21/2023 0.1  0.0 - 0.1 K/UL Final    Absolute Immature Granulocyte 06/21/2023 0.0  0.00 - 0.04 K/UL Final    Differential Type 06/21/2023 AUTOMATED    Final    Troponin, High Sensitivity 06/21/2023 8  0 - 51 ng/L Final    Comment: A HS troponin value change of (+ or -) 50% or more below the 99th percentile, in a 1/2/3 hr interval represents a significant change. Clinical correlation is recommended. A HS troponin value change of (+ or -) 20% or above the 99th percentile, in a 1/2/3 hr interval represents a significant change. Clinical correlation is recommended. 99th Percentile:   Women: 0-51 ng/L                                                                Men:   0-76 ng/L  Patients taking more than 20 mg/day of biotin may have falsely negative results and should not use this test.      Lipase 06/21/2023 146  73 - 393 U/L Final    Total Protein 06/21/2023 7.6  6.4 - 8.2 g/dL Final    Albumin 06/21/2023 3.3 (L)  3.5 - 5.0 g/dL Final    Globulin 06/21/2023 4.3 (H)  2.0 - 4.0 g/dL Final    Albumin/Globulin Ratio 06/21/2023 0.8 (L)  1.1 - 2.2   Final    Total Bilirubin 06/21/2023 0.7  0.2 - 1.0 MG/DL Final    Bilirubin, Direct 06/21/2023 0.1  0.0 - 0.2 MG/DL Final    Alk Phosphatase 06/21/2023 100  45 - 117 U/L Final    AST 06/21/2023 30  15 - 37 U/L Final    ALT 06/21/2023 28  12 - 78 U/L Final    Troponin, High Sensitivity 06/21/2023 7  0 - 51 ng/L Final    Comment: A HS troponin value change of (+ or -) 50% or more below the 99th percentile, in a 1/2/3 hr interval represents a significant change. Clinical correlation is recommended. A HS troponin value change of (+ or -) 20% or above the 99th percentile, in a 1/2/3 hr interval represents a significant change. Clinical correlation is recommended.   99th Percentile:   Women: 0-51 ng/L                                                                Men:   0-76 ng/L  Patients taking more than 20 mg/day of biotin may have falsely negative results

## 2023-06-21 NOTE — ED TRIAGE NOTES
Pt arrives ambulatory to ED with complaints of right sided chest pain starting at 0300 today radiating to epigastric area, she states \"It feels like hiatal hernia pain and I have had many of those\". Reports nausea and lightheadedness x3 weeks. Hx of afib and takes Eliquis.

## 2023-06-22 LAB
EKG DIAGNOSIS: NORMAL
EKG Q-T INTERVAL: 402 MS
EKG QRS DURATION: 88 MS
EKG QTC CALCULATION (BAZETT): 414 MS
EKG R AXIS: 50 DEGREES
EKG T AXIS: 94 DEGREES
EKG VENTRICULAR RATE: 64 BPM

## 2023-06-22 PROCEDURE — 93010 ELECTROCARDIOGRAM REPORT: CPT | Performed by: SPECIALIST

## 2023-07-18 ENCOUNTER — HOSPITAL ENCOUNTER (OUTPATIENT)
Facility: HOSPITAL | Age: 83
Discharge: HOME | End: 2023-07-21
Attending: FAMILY MEDICINE
Payer: MEDICARE

## 2023-07-18 DIAGNOSIS — E04.1 RIGHT THYROID NODULE: ICD-10-CM

## 2023-07-18 PROCEDURE — 76536 US EXAM OF HEAD AND NECK: CPT

## 2024-06-03 ENCOUNTER — TRANSCRIBE ORDERS (OUTPATIENT)
Facility: HOSPITAL | Age: 84
End: 2024-06-03

## 2024-06-03 DIAGNOSIS — Z12.31 VISIT FOR SCREENING MAMMOGRAM: Primary | ICD-10-CM

## 2024-08-09 ENCOUNTER — HOSPITAL ENCOUNTER (OUTPATIENT)
Facility: HOSPITAL | Age: 84
End: 2024-08-09
Payer: MEDICARE

## 2024-08-09 VITALS — WEIGHT: 206 LBS | BODY MASS INDEX: 35.17 KG/M2 | HEIGHT: 64 IN

## 2024-08-09 DIAGNOSIS — Z12.31 VISIT FOR SCREENING MAMMOGRAM: ICD-10-CM

## 2024-08-09 PROCEDURE — 77067 SCR MAMMO BI INCL CAD: CPT

## 2025-06-05 ENCOUNTER — TRANSCRIBE ORDERS (OUTPATIENT)
Facility: HOSPITAL | Age: 85
End: 2025-06-05

## 2025-06-05 DIAGNOSIS — M79.604 RIGHT LEG PAIN: ICD-10-CM

## 2025-06-05 DIAGNOSIS — R09.89 BRUIT OF LEFT CAROTID ARTERY: ICD-10-CM

## 2025-06-05 DIAGNOSIS — I10 ESSENTIAL HYPERTENSION: Primary | ICD-10-CM

## 2025-06-06 ENCOUNTER — TRANSCRIBE ORDERS (OUTPATIENT)
Facility: HOSPITAL | Age: 85
End: 2025-06-06

## 2025-06-06 DIAGNOSIS — I10 HYPERTENSION, ESSENTIAL: Primary | ICD-10-CM

## 2025-06-12 ENCOUNTER — TRANSCRIBE ORDERS (OUTPATIENT)
Facility: HOSPITAL | Age: 85
End: 2025-06-12

## 2025-06-12 DIAGNOSIS — I10 ESSENTIAL HYPERTENSION, MALIGNANT: Primary | ICD-10-CM

## 2025-07-09 ENCOUNTER — HOSPITAL ENCOUNTER (OUTPATIENT)
Dept: VASCULAR SURGERY | Facility: HOSPITAL | Age: 85
Discharge: HOME | End: 2025-07-11
Attending: FAMILY MEDICINE
Payer: MEDICARE

## 2025-07-09 DIAGNOSIS — M79.604 RIGHT LEG PAIN: ICD-10-CM

## 2025-07-09 DIAGNOSIS — R09.89 BRUIT OF LEFT CAROTID ARTERY: ICD-10-CM

## 2025-07-09 DIAGNOSIS — I10 ESSENTIAL HYPERTENSION: ICD-10-CM

## 2025-07-09 PROCEDURE — 93922 UPR/L XTREMITY ART 2 LEVELS: CPT

## 2025-07-09 PROCEDURE — 93880 EXTRACRANIAL BILAT STUDY: CPT

## 2025-07-10 LAB
VAS LEFT ABI: 1.22
VAS LEFT CCA DIST EDV: 19.5 CM/S
VAS LEFT CCA DIST PSV: 89.7 CM/S
VAS LEFT CCA PROX EDV: 19.5 CM/S
VAS LEFT CCA PROX PSV: 83.2 CM/S
VAS LEFT DORSALIS PEDIS BP: 230 MMHG
VAS LEFT ECA EDV: 6.4 CM/S
VAS LEFT ECA PSV: 70 CM/S
VAS LEFT ICA DIST EDV: 26.1 CM/S
VAS LEFT ICA DIST PSV: 87.4 CM/S
VAS LEFT ICA MID EDV: 21.7 CM/S
VAS LEFT ICA MID PSV: 85.3 CM/S
VAS LEFT ICA PROX EDV: 17.3 CM/S
VAS LEFT ICA PROX PSV: 56.8 CM/S
VAS LEFT ICA/CCA PSV: 1 NO UNITS
VAS LEFT SUBCLAVIAN PROX EDV: 0 CM/S
VAS LEFT SUBCLAVIAN PROX PSV: 111.7 CM/S
VAS LEFT TBI: 0.65
VAS LEFT TOE PRESSURE: 122 MMHG
VAS RIGHT ABI: 1.22
VAS RIGHT ANKLE BP: 230 MMHG
VAS RIGHT ARM BP: 188 MMHG
VAS RIGHT CCA DIST EDV: 17.5 CM/S
VAS RIGHT CCA DIST PSV: 63.1 CM/S
VAS RIGHT CCA PROX EDV: 15.7 CM/S
VAS RIGHT CCA PROX PSV: 105.2 CM/S
VAS RIGHT DORSALIS PEDIS BP: 230 MMHG
VAS RIGHT ECA EDV: 12 CM/S
VAS RIGHT ECA PSV: 63.1 CM/S
VAS RIGHT ICA DIST EDV: 8.8 CM/S
VAS RIGHT ICA DIST PSV: 44.3 CM/S
VAS RIGHT ICA MID EDV: 19.3 CM/S
VAS RIGHT ICA MID PSV: 63.1 CM/S
VAS RIGHT ICA PROX EDV: 12 CM/S
VAS RIGHT ICA PROX PSV: 54 CM/S
VAS RIGHT ICA/CCA PSV: 1 NO UNITS
VAS RIGHT PTA BP: 230 MMHG
VAS RIGHT SUBCLAVIAN PROX EDV: 0 CM/S
VAS RIGHT SUBCLAVIAN PROX PSV: 103.4 CM/S
VAS RIGHT TBI: 0.85
VAS RIGHT TOE PRESSURE: 159 MMHG
VAS RIGHT VERTEBRAL EDV: 13.9 CM/S
VAS RIGHT VERTEBRAL PSV: 50.3 CM/S

## 2025-08-11 ENCOUNTER — TRANSCRIBE ORDERS (OUTPATIENT)
Facility: HOSPITAL | Age: 85
End: 2025-08-11

## 2025-08-11 DIAGNOSIS — Z12.31 ENCOUNTER FOR SCREENING MAMMOGRAM FOR BREAST CANCER: Primary | ICD-10-CM
